# Patient Record
Sex: MALE | Race: WHITE | Employment: OTHER | URBAN - METROPOLITAN AREA
[De-identification: names, ages, dates, MRNs, and addresses within clinical notes are randomized per-mention and may not be internally consistent; named-entity substitution may affect disease eponyms.]

---

## 2017-03-06 ENCOUNTER — TELEPHONE (OUTPATIENT)
Dept: FAMILY MEDICINE | Facility: CLINIC | Age: 82
End: 2017-03-06

## 2017-03-06 NOTE — TELEPHONE ENCOUNTER
Reason for call: Jose Medina, investigator with [facility], requesting cognitive assessment for pt.  Neighbor of pt called [facility] with concerns about pt.  He is a former pt of  ([facility]),  Neighbors have been helping pt this past year with groceries, looking in on pt.  They disconnected battery in car 6/2016 so pt could not drive.  Pt has not taken meds in over a year, incontinent and home is filthy.  Weight loss is significant, only eating once daily.  Jose requesting call back after pt is seen by doctor..    Phone Number Jose: [phone]  Best Time: Anytime  Can we leave a detailed message on this number? Yes

## 2017-03-16 ENCOUNTER — HOSPITAL ENCOUNTER (OUTPATIENT)
Facility: CLINIC | Age: 82
Setting detail: OBSERVATION
Discharge: NURSING, REHAB OR LONG-TERM CARE | End: 2017-03-21
Attending: NURSE PRACTITIONER | Admitting: HOSPITALIST
Payer: MEDICARE

## 2017-03-16 DIAGNOSIS — G30.1 LATE ONSET ALZHEIMER'S DISEASE WITHOUT BEHAVIORAL DISTURBANCE (H): ICD-10-CM

## 2017-03-16 DIAGNOSIS — Z74.09 IMPAIRED MOBILITY: ICD-10-CM

## 2017-03-16 DIAGNOSIS — F02.80 LATE ONSET ALZHEIMER'S DISEASE WITHOUT BEHAVIORAL DISTURBANCE (H): ICD-10-CM

## 2017-03-16 DIAGNOSIS — E53.8 VITAMIN B12 DEFICIENCY (NON ANEMIC): Primary | ICD-10-CM

## 2017-03-16 DIAGNOSIS — R62.7 FAILURE TO THRIVE IN ADULT: ICD-10-CM

## 2017-03-16 DIAGNOSIS — R41.3 MEMORY DIFFICULTIES: ICD-10-CM

## 2017-03-16 PROBLEM — Z60.9 SOCIAL PROBLEM: Status: ACTIVE | Noted: 2017-03-16

## 2017-03-16 LAB
ALBUMIN SERPL-MCNC: 3.4 G/DL (ref 3.4–5)
ALBUMIN UR-MCNC: NEGATIVE MG/DL
ALP SERPL-CCNC: 110 U/L (ref 40–150)
ALT SERPL W P-5'-P-CCNC: 11 U/L (ref 0–70)
ANION GAP SERPL CALCULATED.3IONS-SCNC: 9 MMOL/L (ref 3–14)
APPEARANCE UR: CLEAR
AST SERPL W P-5'-P-CCNC: 15 U/L (ref 0–45)
BILIRUB SERPL-MCNC: 1.2 MG/DL (ref 0.2–1.3)
BILIRUB UR QL STRIP: NEGATIVE
BUN SERPL-MCNC: 16 MG/DL (ref 7–30)
CALCIUM SERPL-MCNC: 8.7 MG/DL (ref 8.5–10.1)
CHLORIDE SERPL-SCNC: 108 MMOL/L (ref 94–109)
CO2 SERPL-SCNC: 23 MMOL/L (ref 20–32)
COLOR UR AUTO: NORMAL
CREAT SERPL-MCNC: 1.01 MG/DL (ref 0.66–1.25)
ERYTHROCYTE [DISTWIDTH] IN BLOOD BY AUTOMATED COUNT: 13.3 % (ref 10–15)
GFR SERPL CREATININE-BSD FRML MDRD: 70 ML/MIN/1.7M2
GLUCOSE SERPL-MCNC: 112 MG/DL (ref 70–99)
GLUCOSE UR STRIP-MCNC: NEGATIVE MG/DL
HCT VFR BLD AUTO: 39.8 % (ref 40–53)
HGB BLD-MCNC: 13.3 G/DL (ref 13.3–17.7)
HGB UR QL STRIP: NEGATIVE
KETONES UR STRIP-MCNC: NEGATIVE MG/DL
LEUKOCYTE ESTERASE UR QL STRIP: NEGATIVE
MCH RBC QN AUTO: 30 PG (ref 26.5–33)
MCHC RBC AUTO-ENTMCNC: 33.4 G/DL (ref 31.5–36.5)
MCV RBC AUTO: 90 FL (ref 78–100)
NITRATE UR QL: NEGATIVE
PH UR STRIP: 6.5 PH (ref 5–7)
PLATELET # BLD AUTO: 126 10E9/L (ref 150–450)
POTASSIUM SERPL-SCNC: 3.8 MMOL/L (ref 3.4–5.3)
PROT SERPL-MCNC: 7.2 G/DL (ref 6.8–8.8)
RBC # BLD AUTO: 4.44 10E12/L (ref 4.4–5.9)
SODIUM SERPL-SCNC: 140 MMOL/L (ref 133–144)
SP GR UR STRIP: 1 (ref 1–1.03)
URN SPEC COLLECT METH UR: NORMAL
UROBILINOGEN UR STRIP-MCNC: NORMAL MG/DL (ref 0–2)
WBC # BLD AUTO: 3.2 10E9/L (ref 4–11)

## 2017-03-16 PROCEDURE — G0378 HOSPITAL OBSERVATION PER HR: HCPCS

## 2017-03-16 PROCEDURE — 40000916 ZZH STATISTIC SITTER, NIGHT HOURS

## 2017-03-16 PROCEDURE — 85027 COMPLETE CBC AUTOMATED: CPT | Performed by: NURSE PRACTITIONER

## 2017-03-16 PROCEDURE — 81003 URINALYSIS AUTO W/O SCOPE: CPT | Performed by: NURSE PRACTITIONER

## 2017-03-16 PROCEDURE — 80053 COMPREHEN METABOLIC PANEL: CPT | Performed by: NURSE PRACTITIONER

## 2017-03-16 PROCEDURE — 99220 ZZC INITIAL OBSERVATION CARE,LEVL III: CPT | Performed by: PHYSICIAN ASSISTANT

## 2017-03-16 PROCEDURE — 25000132 ZZH RX MED GY IP 250 OP 250 PS 637: Mod: GY | Performed by: PHYSICIAN ASSISTANT

## 2017-03-16 PROCEDURE — 99285 EMERGENCY DEPT VISIT HI MDM: CPT | Mod: 25

## 2017-03-16 PROCEDURE — A9270 NON-COVERED ITEM OR SERVICE: HCPCS | Mod: GY | Performed by: PHYSICIAN ASSISTANT

## 2017-03-16 RX ORDER — ACETAMINOPHEN 325 MG/1
650 TABLET ORAL EVERY 4 HOURS PRN
Status: DISCONTINUED | OUTPATIENT
Start: 2017-03-16 | End: 2017-03-21 | Stop reason: HOSPADM

## 2017-03-16 RX ORDER — AMOXICILLIN 250 MG
1-2 CAPSULE ORAL 2 TIMES DAILY PRN
Status: DISCONTINUED | OUTPATIENT
Start: 2017-03-16 | End: 2017-03-21 | Stop reason: HOSPADM

## 2017-03-16 RX ORDER — ONDANSETRON 2 MG/ML
4 INJECTION INTRAMUSCULAR; INTRAVENOUS EVERY 6 HOURS PRN
Status: DISCONTINUED | OUTPATIENT
Start: 2017-03-16 | End: 2017-03-21 | Stop reason: HOSPADM

## 2017-03-16 RX ORDER — ACETAMINOPHEN 650 MG/1
650 SUPPOSITORY RECTAL EVERY 4 HOURS PRN
Status: DISCONTINUED | OUTPATIENT
Start: 2017-03-16 | End: 2017-03-21 | Stop reason: HOSPADM

## 2017-03-16 RX ORDER — ONDANSETRON 4 MG/1
4 TABLET, ORALLY DISINTEGRATING ORAL EVERY 6 HOURS PRN
Status: DISCONTINUED | OUTPATIENT
Start: 2017-03-16 | End: 2017-03-21 | Stop reason: HOSPADM

## 2017-03-16 RX ORDER — OLANZAPINE 5 MG/1
5 TABLET, ORALLY DISINTEGRATING ORAL
Status: DISCONTINUED | OUTPATIENT
Start: 2017-03-16 | End: 2017-03-21 | Stop reason: HOSPADM

## 2017-03-16 RX ORDER — NALOXONE HYDROCHLORIDE 0.4 MG/ML
.1-.4 INJECTION, SOLUTION INTRAMUSCULAR; INTRAVENOUS; SUBCUTANEOUS
Status: DISCONTINUED | OUTPATIENT
Start: 2017-03-16 | End: 2017-03-21 | Stop reason: HOSPADM

## 2017-03-16 RX ORDER — HYDRALAZINE HYDROCHLORIDE 20 MG/ML
10 INJECTION INTRAMUSCULAR; INTRAVENOUS EVERY 4 HOURS PRN
Status: DISCONTINUED | OUTPATIENT
Start: 2017-03-16 | End: 2017-03-21 | Stop reason: HOSPADM

## 2017-03-16 RX ADMIN — OLANZAPINE 5 MG: 5 TABLET, ORALLY DISINTEGRATING ORAL at 22:42

## 2017-03-16 ASSESSMENT — ENCOUNTER SYMPTOMS
CONFUSION: 1
FEVER: 0
APPETITE CHANGE: 1
ABDOMINAL PAIN: 0
COUGH: 0

## 2017-03-16 NOTE — ED NOTES
Son Anish called and said he was told to call because his father was brought in.  Explained to son  called for welfare check because pt missed appointment with  yesterday.  Pt will be evaluated and would update son when completed Son's number placed in chart.  Son said he has some dementia

## 2017-03-16 NOTE — IP AVS SNAPSHOT
` `     [place] [address] MEDICAL SPECIALTY UNIT: [phone]                 INTERAGENCY TRANSFER FORM - NOTES (H&P, Discharge Summary, Consults, Procedures, Therapies)   3/16/2017                    Hospital Admission Date: 3/16/2017  EMA VILA   : 1928  Sex: Male        Patient PCP Information     Provider PCP Type    Johnny Tafoya MD, MD General         History & Physicals      H&P signed by Jesus Thurman PA-C at 3/17/2017  7:53 AM      Author:  Jesus Thurman PA-C Service:  Hospitalist Author Type:  Physician Assistant - C    Filed:  3/17/2017  7:53 AM Date of Service:  3/16/2017  4:35 PM Note Created:  3/16/2017  5:37 PM    Status:  Cosign Needed :  Jesus Thurman PA-C (Physician Assistant - [facility])    Cosign Required:  Yes             PRIMARY CARE PHYSICIAN:  Johnny Tafoya MD      CHIEF COMPLAINT:  Welfare check.      HISTORY OF PRESENT ILLNESS:  Ema Vila is an 89-year-old male with past medical history of hip osteoarthritis, severe aortic stenosis, iron deficiency anemia and peripheral neuropathy who presented to the Emergency Department via EMS for evaluation of vulnerable adult.  Patient reportedly did not show to a previously scheduled PCP appointment to establish care with a new primary care provider as the current one is out of practice.  Adult Protection of [facility] was called regarding patient due to a neighbor's becoming concerned and requesting a cognitive assessment.  Apparently over the past year, patient's neighbors have been assisting patient with groceries and making sure that he is eating and checking in on him.  They note that he has not taken any medications in at least a year and they did disconnect his car battery in 2016 so that patient would not be able to drive.  Additionally, there was some concern about the nature of the patient's house and his ability to care for himself .     Adult Protection was initially contacted on 2017 and subsequently  "yesterday, 03/15/2017, patient was a no-show for his appointment to establish care and therefore clinic did notify the [facility] who sent out an officer to check on patient.  Upon arriving at the home, the [facility]  noted that the home was in disarray, that there was fecal matter stained on a mattress and the patient apparently had been voiding in a bucket next to his chair as it was too difficult for him to ambulate to and from the bathroom.  Therefore, out of concern for patient's safety he was transported to the Emergency Department at [facility] for evaluation.      Upon arrival in the Emergency Department, the patient was seen by Jean Eaton NP, who noted the patient to be slightly disheveled and also noted that patient was not aware of why he was brought in to the Emergency Department and was continuing to demand to return home.  He was evaluated with laboratory studies including a CMP and a CBC which were unremarkable.  UA was also performed and was negative.  The patient overall was afebrile with a normal heart rate in appearance and again persistently requested to be discharged home.  However, out of patient's safety, Hospitalist Service was contacted for observation admission.      The patient was presently evaluated in the Emergency Department.  He currently tells me that he does not understand why he needs to come in the hospital and that he feels fine and that he typically does all of his grocery shopping and that he drives to all of his appointments and that he is extremely independent at baseline.  He denies the difficulty ambulating, stating \"that's why I have a cane.\"  He also does not feel that he is unsafe at home and persistently requests to return home.  He denies any recent fevers or chills, difficulty breathing, chest pain, chest discomfort, shortness of breath, abdominal pain.  He notes that he has been having normal bowel and bladder habits and that he is otherwise " at his regular, normal baseline.      PAST MEDICAL HISTORY:  This is obtained via the charting.   1.  Hip osteoarthritis of the right hip.   2.  Severe aortic stenosis.  The patient was most recently seen by Cardiology in  and at that time recommended valve replacement the next 6 months.  The patient's most recent echocardiogram was performed in  revealing an EF of 75% to 80%, mild left ventricular hypertrophy and severe aortic stenosis with a gradient of 42 mmHg, no evidence of aortic insufficiency at that time.   3.  Iron deficiency anemia.   4.  Weight loss.  The patient was noted to have weight loss in PCP appointments as recently as .  He at that time weighed 189 pounds and he was noted to have recently lost 45 pounds within the past 18 months prior to that.  Today, he now weighs 205 pounds.   5.  Peripheral neuropathy.   6.  Hypertension.   7.  Gout.   8.  Cervical degenerative disk disease.      PAST SURGICAL HISTORY:   1.  Lumbar spine diskectomy.   2.  Left knee arthroscopy.      PRIOR TO ADMISSION MEDICATIONS:  The patient is not taking any currently.      ALLERGIES:  Penicillin causing a rash.      FAMILY HISTORY:  Father passed secondary to prostate cancer at the age of 82 and mother  in her 90s due to natural causes.      SOCIAL HISTORY:  The patient presently is living in a single family home by himself as noted above.  He has neighbors who have been assisting with his cares, however, have been increasingly worried about his ability to care for himself.  He does not smoke tobacco on a regular basis, does not consume alcohol on a regular basis.      REVIEW OF SYSTEMS:  A 10-point review of systems was performed and is otherwise negative.  Please refer to the HPI.      PHYSICAL EXAMINATION:   VITAL SIGNS:  Temperature 97.7, heart rate of 80, respiratory rate 18, blood pressure 164/91.  SpO2 99% on room air.   GENERAL:  Well-developed, well-nourished male who appears comfortable.   HEENT:   Head is normocephalic.  Pupils are equal, round, reactive bilaterally.  EOMs are intact bilaterally.  Nose, mouth are patent.  Mucous membranes are moist.   LUNGS:  Clear to auscultation bilaterally without wheezes or crackles.  There is no increased work of breathing.   CARDIOVASCULAR:  Regular rate and rhythm, normal S1, S2.  There is a large systolic ejection murmur.  Radial and pedal pulses are 2+ bilaterally.   ABDOMEN:  Soft, nontender, nondistended, no guarding or rigidity.  Positive bowel sounds in all 4 quadrants.  The patient is spontaneously moving bilateral upper and lower extremities. SKIN:  Warm and dry.  There is no rash.  There is no pedal edema.      LABORATORY IMAGING:  As reviewed in Epic and as noted in HPI.      ASSESSMENT AND PLAN:  Bhaskar Hope is an 89-year-old male with past medical history of hip osteoarthritis, severe aortic stenosis and iron deficiency anemia who presented to the Emergency Department following recheck performed by a [facility]  and finding the house in disarray and concern over patient's home safety and inability to care for himself at home and he will therefore be admitted to the observation unit for evaluation and possible placement.   1.  Inability to care for self.  The patient, as noted above, was found to have a house in disarray with minimal oral intake and has unknowingly been reliant on his neighbors for food and care over the past year.  He tells me that he is continuing to drive and that he goes to all of his appointments and does all of his grocery shopping; however, the reality is that his neighbors have disconnected his car battery in 06/2016 and he has not seen a primary care provider in at least 1 year and does not keep up on any of his grocery shopping.  It appears that he has limited mobility secondary to his right hip osteoarthritis and there is some concern about his inability to ambulate and care for herself at home.  The patient has never  been formally diagnosed with memory problems or memory loss; however, it is quite apparent that he does not appear to have a great deal short-term memory at this time.  On review of chart, there is a note from 07/2015 from the last one last times that Dr. Johnny Tafoya saw patient in clinic and at that time he did present with his son who noticed that patient has been unable to handle his living arrangements at home and that he sits in a chair all day with his front and back door open so people can enter.  He also at that time was having difficulty shopping and preparing meals due to severe right hip osteoarthritis.  Additionally at that time, the patient appeared to have a similar presentation to today where he was confabulating and stating that he commonly goes to the grocery store and buys groceries, walks around the block several times a day and that he continues to drive, which again is unfortunately not the case.  It is also noted in the PCP appointment note that home health aide was ordered for patient, but patient refused this and that the son had been looking into assisted living at that time in 2015.  Due to patient's ongoing memory issues and persistent desire to return home with the belief that he is managing extremely well, we will ask Psychiatry to evaluate for competency and additionally will ask Physical Therapy to evaluate for patient's ability to ambulate and manage at home safely.  Social work will be consulted for discharge planning, as patient will likely require placement.  2.  Severe aortic stenosis.  As noted above, patient has not been compliant with followup as an outpatient and as a result has not been taking any medication for this.  He appears presently to be decompensated.  He has no lower extremity edema.  He denies any dizziness or lightheadedness, albeit he has also not been ambulating very much.  We will continue to monitor him while under observation,  particularly when up and  ambulating and will provide referral to Cardiology at discharge if indicated.   3.  History of iron deficiency anemia.  The patient's hemoglobin today is presently within normal limits at 13.3.  Continued monitoring as an outpatient.   4.  Elevated blood pressure.  The patient reportedly with a remote history of hypertension; however, per primary care note from , this had resolved at that time. Patient's blood pressure today is elevated at 164/91 and may be a stress response.  We will continue to monitor.  The patient will have p.r.n. hydralazine available for systolic blood pressure greater than 180.  Otherwise, will defer to outpatient setting pending evaluation as above.   5.  Deep venous thrombosis prophylaxis:  Ambulation as I anticipate short stay.      The patient was staffed with Dr. Jonathan Coats who independently interviewed and evaluated patient and is in agreement with the above-mentioned plan.         JONATHAN COATS MD       As dictated by JESUS RANDLE PA-C            D: 2017 16:35   T: 2017 17:37   MT: TYLER      Name:     EMA VILA   MRN:      [id number]        Account:      [id number]   :      1928           Admitted:     [id number]      Document: [id number]       cc: Johnny Tafoya MD[AF1.1]        Revision History        User Key Date/Time User Provider Type Action    > AF1.1 3/17/2017  7:53 AM Jesus Randle PA-C Physician Assistant - [facility] Sign     [N/A] 3/16/2017  5:37 PM Jesus Randle PA-C Physician Assistant - [facility] Edit                  Discharge Summaries     No notes of this type exist for this encounter.         Consult Notes      Consults signed by Frederic Kamara MD at 3/19/2017 12:34 AM      Author:  Frederic Kamara MD Service:  Psychiatry Author Type:  Physician    Filed:  3/19/2017 12:34 AM Date of Service:  3/17/2017  8:51 PM Note Created:  3/18/2017  2:59 AM    Status:  Signed :  Frederic Kamara MD (Physician)         PSYCHIATRIC  "CONSULTATION      DATE OF CONSULTATION:  03/17/2017      REQUESTING PHYSICIAN:  Jesus Thurman PA-C      REASON FOR CONSULTATION:  Competency.      IDENTIFICATION:  Mr. Bhaskar Hope is an 89-year-old  male, possibly , who lives in [place].  No prior psychiatric exams.  No prior chemical dependency treatment, admitted after a vulnerable adult evaluation by EMS.      HISTORY OF PRESENT ILLNESS:  Mr. Hope states he feels fine.  He denies feeling depressed or down at all.  Does not have any psychiatric symptoms, denies obsessions, compulsions or psychosis.  No panic disorder.  He thinks his memory is fine.  Apparently his neighbor noticed, had been assisting patient with groceries and making sure that he was eating and checking on him, then he noted that he had not taken his medications in at least a year.  \"I disconnected his car battery 06/2016 so he wouldn't drive anymore.\"  Adult Protection was called, they came after a primary care visit was missed.   noticed his house was in disarray, there was fecal matter stained on the mattress.  Patient apparently had been voiding in a bucket next to his chair; too difficult for him to ambulate to and from the bathroom.  Patient was disheveled, no signs of infection.      PAST PSYCHIATRIC HISTORY:  None.      PAST MEDICAL HISTORY:  He has a history of osteoarthritis, severe aortic stenosis, elevated blood pressure.  Also, significant weight loss 45 pounds.        ALLERGIES:  In old history, it lists that he has a penicillin rash.        MEDICAL REVIEW OF SYSTEMS:  A 10-point review of systems was performed and otherwise negative except for the history of present illness by Jonathan Coats MD and dictated by Jesus Thurman PA-C on 03/16/2017, reviewed by Dr. Kamara on 03/17/2017.  No changes.      PHYSICAL EXAMINATION:   VITAL SIGNS:  Temperature 97.7, heart rate 80, respiration 18, blood pressure 169/91, oxygen saturation 99%.      SOCIAL " "HISTORY:  The patient states that he was born and raised in [place].  Said he was an only child.  Grew up with both parents.  Graduated from [employer].  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  He states that he is still  and lives with his wife.  At this point, patient does not have a wife in the house but he could not remember that.      MENTAL STATUS EXAMINATION:  Appearance:  Patient was sitting up in bed, dressed in hospital attire.  Appeared his stated age.  His attitude was cooperative.  He was oriented to person but not place or time.  His eye contact was fair.  His speech was regular rate and rhythm, normal volume and tone.  Language normal.  Psychomotor behavior normal.  Mood was euthymic.  Affect was congruent.  Thought process goal oriented and intact.  Thought content negative for suicidal ideation, negative for plan or intent, negative for obsessions, compulsions, or psychosis.  No looseness of associations.  Fund of knowledge impaired.  Insight impaired.  Judgment impaired.  Attention span and concentration poor.  Recent and remote memory poor.  Gait not tested.  A Folstein Mini Mental Status was performed by Dr. Kamara, scored 11/30, which is significant impairment.  When asked about current events, he did not know who the president was.  He thought it was maybe that \"black man\" was Heri Proctor, did not know that Jeremías Wright was currently president.      ASSESSMENT:  Mr. Hope is an 89-year-old either  or   male who is currently living alone in the community and he has significant memory impairment, is clearly in need of placement.  Welfare check came to look in on him and he had feces on his bed.  He was trying to pee in a bucket because he could not walk around.  He had not taken his medications in " over a year.  Neighbors were trying to take care of him by undoing his car battery.  He has very poor insight and wants to just go home and thinks he is just fine, cannot see why he should not be able to go there.  At this point, no phone numbers are listed for his children.  He does have an adult son apparently in [place], last name Herminia, pretty hard to find that name without a phone number, also has a son in [place].  His neighbor number is listed who had been consulted by the , went to see him for Adult Protection.  The patient is clearly impaired and needs to be in placement.  Guardianship will likely need to be appointed on an emergency basis.      DIAGNOSIS:   Axis I:  Dementia, cognitive impairment without psychosis.      PLAN:   1.  Provide safe space stabilization.   2.  Get an emergency guardianship.   3.  The patient needs to be referred to nursing home placement.         FREDERIC KAMARA MD             D: 2017 20:51   T: 2017 21:51   MT: TD      Name:     EMA VILA   MRN:      [id number]        Account:       [id number]   :      1928           Consult Date:  2017      Document: [id number]       cc: JESUS RANDLE PA-C   [TW1.1]   Revision History        User Key Date/Time User Provider Type Action    > TW1.1 3/19/2017 12:34 AM Frederic Kamara MD Physician Sign            Consults by Frederic Kamara MD at 3/17/2017  8:26 PM     Author:  Frederic Kamara MD Service:  Psychiatry Author Type:  Physician    Filed:  3/17/2017  8:36 PM Date of Service:  3/17/2017  8:26 PM Note Created:  3/17/2017  8:26 PM    Status:  Signed :  Frederic Kamara MD (Physician)     Consult Orders:    1. Psychiatry IP Consult: competency; Consultant may enter orders: Yes; Patient to be seen: Routine; Call back #: [phone] [[phone]] ordered by Jesus Randle PA-C at 17 1503                Initial Psychiatric Consult: Time Spent: 55  "Minutes  Frederic Kamara MD.[TW1.1]      Revision History        User Key Date/Time User Provider Type Action    > TW1.1 3/17/2017  8:36 PM Frederic Kamara MD Physician Sign                     Progress Notes - Physician (Notes from 03/18/17 through 03/21/17)      Progress Notes by Darcy Chen LICSW at 3/21/2017  4:10 PM     Author:  Darcy Chen LICSW Service:  (none) Author Type:      Filed:  3/21/2017  4:19 PM Date of Service:  3/21/2017  4:10 PM Note Created:  3/21/2017  4:10 PM    Status:  Signed :  Darcy Chen LICSW ()         SW:  OT SLUM score confirms significant short term memory deficit with recommendation for TCU or 24 hour supervision.  Patient's neighbor Mario was able to locate patient's check book at home and bring it over to the hospital.  Writer met with patient and Mario and Mario explained to patient that he thought it would be a good idea if patient went to [facility].    Writer reviewed again the plan of going to [facility] for ongoing therapy assessment and recommendation for long term care.  Reviewed the deposit needed of $3,000 and patient accepted the arrangement.  Patient did say \"I guess I don't have any choice\".  Writer had earlier reviewed with patient that if OT concurred with earlier testing that the Dr would not discharge him home and if patient did not voluntarily agree with the recommendation, the MD would possible seek legal involvement.    Writer contacted son Anish and explained above, including the cost for care and that patient will be signing the check.  Son asked if writer would talk with patient about POA for HC and Finances so guardianship would not need to be pursued.  In speaking with patient he believes Sincere Painting has POA of Finances and in relation to HC he would want to appoint \"the same person\".  Updated son and he will follow up with Mr Painting.    Son also given Ladonna Townsend name and number for " resource.[KH1.1]       Revision History        User Key Date/Time User Provider Type Action    > KH1.1 3/21/2017  4:19 PM Darcy Chen LICSW  Sign            Progress Notes by Darcy Chen LICSW at 3/21/2017 12:44 PM     Author:  Darcy Chen LICSW Service:  (none) Author Type:      Filed:  3/21/2017  1:03 PM Date of Service:  3/21/2017 12:44 PM Note Created:  3/21/2017 12:44 PM    Status:  Signed :  Darcy Chen LICSW ()         SW:  D:  Met with patient today and also spoke with two of patient's friends. Doug Griffin [phone], [phone].  He reports patient has poor short term memory.  He states patient will tell me he still drives but he knows he has not driven for over a year. Mario also reports patient hasn't driven for over a year.  Mario also reports that a friend Pedro Painting buys patient's groceries and is .  Writer tried calling Mr Painting, [phone], [phone] but his work voice mail indicates he is out of town till March 27.      Patient is very pleasant and strong in his conviction that he can live independently.    He reports he eats three meals a day and maintains his weight.  He insists that he still drives to [facility] to purchase his groceries. Writer explained his friends report he hasn't driven for over a year.  Explained the adult protection worker reported feces in his house.  Patient declines this and over and over tells writer how he is such a good  and offers writer to come to his house to see.    Writer recommended he transfer to [facility] for further assessment to determine if professionals feel he can return home and if not suggested he would be encouraged to move into [facility] or move to an assisted living.  Explained the cost for the TCU will be approximately $300.00 a day and he will be required to make a down payment of $3,000.  [facility] as his father lived there and patients home is only a few blocks away.   Patient continues to believe he can return home and doesn't see the need for admission to a transitional care center.  Patient appears to be confabulating about his driving.  Writer did tell patient that MD will not d/c him home and that if he doesn't voluntarily agree to the plan, the doctor may ask the courts to decide if he can go home.  Writer also explained he had been given an exam by the psychiatrist indicating he cannot return home.  Again patient doesn't remember this or agree with the findings.  Given this, writer has confired with care coordinator and we are asking OT to complete SlUMS testwith patient today.  If they concur with Dr Kamara's assessment then writer feels patient will cooperate.  If he doesn't cooperate then we will likely need to consider petition for commitment.  OT will see patient around 1400[KH1.1]         Revision History        User Key Date/Time User Provider Type Action    > KH1.1 3/21/2017  1:03 PM [facility]  [facility]            Progress Notes by Raquel Long MD at 3/20/2017  4:49 PM     Author:  Raquel Long MD Service:  Hospitalist Author Type:  Physician    Filed:  3/20/2017  4:58 PM Date of Service:  3/20/2017  4:49 PM Note Created:  3/20/2017  4:49 PM    Status:  Signed :  Raquel Long MD (Physician)         [facility]    Hospitalist Progress Note    Date of Service (when I saw the patient): 03/20/2017    Assessment & Plan   Bhaskar Hope is an 89-year-old male with past medical history of hip osteoarthritis, severe aortic stenosis and iron deficiency anemia who presented to the Emergency Department following recheck performed by a [facility]  and finding the house in disarray and concern over patient's home safety and inability to care for himself at home and he is admitted under observation for evaluation and  possible placement.      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 He was found to have a house in disarray with minimal oral intake and has unknowingly been reliant on his neighbors for food and care over the past 1-2 years. He reports that he is continuing to drive and that he goes to all of his appointments and does all of his grocery shopping; however, the reality is that his neighbors have disconnected his car battery in 06/2016 and he has not seen a primary care provider in at least 1 year and does not keep up on any of his grocery shopping. It appears that he has limited mobility secondary to his right hip osteoarthritis and there is some concern about his inability to ambulate and care for herself at home.   - No formal diagnosis of memory problems or cognitive impairment, but during interview he displays problem with short memory   - Due to patient's ongoing memory issues and persistent desire to return home with the belief that he is managing extremely well, Psychiatry was asked to evaluate for competency, and agree exam is consistent with dementia   - PT recommending home with 24 hour supervision or TCU.   - SW also following for safe discharge planning, he will definitely need placement or 24 hrs supervision  - Treat B12 deficiency (see below)  - TSH 5.91, likely normal for age, check T4. F/u in 3 months.   - CT scan of the head as outpatient. No focal deficits.     B12 Deficiency   - Start 1000 mcg IM daily for 1 week, then weekly for 4 weeks, then re-assess level and consider change to PO.   - Normal hgb @ admission (13.3>12.1) and normal MCV, mildly low WBC 3.2>4.5 and Plts 133.   - Folate normal.      Severe aortic stenosis.   He denies any dizziness or lightheadedness, albeit he has also not been ambulating very much.  - will monitor at this time     History of iron deficiency anemia.   hgb 13.3 at admission, nl MCV. >      Hx of HTN: has  not been on meds for this since 2015.   - would not be aggressive in lowering BP in light of his severe AS  - BP controlled.      DVT Prophylaxis: ambulatio     Disposition: pending safe placement plan.     Raquel Long  [phone] (p)  Text Page (7AM - 6PM)     Interval History   Patient continues to adamantly deny that he has any problem caring for himself. Hopeful discharge to [facility] tomorrow. Coordinating with son.     -Data reviewed today: I reviewed all new labs and imaging results over the last 24 hours. I personally reviewed no images or EKG's today.    Physical Exam   Temp: 97.3  F (36.3  C) Temp src: Oral BP: 123/76 Pulse: 78   Resp: 16 SpO2: 98 % O2 Device: None (Room air)    Vitals:    03/16/17 1201 03/16/17 1900   Weight: 93 kg (205 lb) 83.5 kg (184 lb)     Vital Signs with Ranges  Temp:  [97.3  F (36.3  C)-98.6  F (37  C)] 97.3  F (36.3  C)  Pulse:  [71-80] 78  Resp:  [16-18] 16  BP: (103-123)/(68-76) 123/76  SpO2:  [97 %-98 %] 98 %  I/O last 3 completed shifts:  In: 600 [P.O.:600]  Out: 400 [Urine:400]    Constitutional:  NAD, Sitting up in bed. Very interactive and talkative.   Neuropsyche: alert, oriented to being in hospital, but not date or recent event, very short term memory.   Resp breathing comfortably, no edema.       Medications   All medications reviewed on 3/18   No scheduled medications currently.           Data     Recent Labs  Lab 03/20/17  0840 03/16/17  1240   WBC 4.5 3.2*   HGB 12.1* 13.3   MCV 91 90   * 126*   NA  --  140   POTASSIUM  --  3.8   CHLORIDE  --  108   CO2  --  23   BUN  --  16   CR  --  1.01   ANIONGAP  --  9   VINH  --  8.7   GLC  --  112*   ALBUMIN  --  3.4   PROTTOTAL  --  7.2   BILITOTAL  --  1.2   ALKPHOS  --  110   ALT  --  11   AST  --  15       No results found for this or any previous visit (from the past 24 hour(s)).[MM1.1]         Revision History        User Key Date/Time User Provider Type Action    > MM1.1 3/20/2017  4:58 PM Raquel Long  MD ABIODUN Physician Sign            Progress Notes by Darcy Chen LICSW at 3/20/2017  4:47 PM     Author:  Darcy Chen LICSW Service:  (none) Author Type:      Filed:  3/20/2017  4:50 PM Date of Service:  3/20/2017  4:47 PM Note Created:  3/20/2017  4:47 PM    Status:  Signed :  Darcy Chen LICSW ()           Working with son and [facility] regarding placment.[KH1.1]     Revision History        User Key Date/Time User Provider Type Action    > KH1.1 3/20/2017  4:50 PM Darcy Chen LICSW  Sign            Progress Notes by Raquel Long MD at 3/19/2017  3:42 PM     Author:  Raquel Long MD Service:  Hospitalist Author Type:  Physician    Filed:  3/19/2017  3:45 PM Date of Service:  3/19/2017  3:42 PM Note Created:  3/19/2017  3:42 PM    Status:  Signed :  Raquel Long MD (Physician)         [facility]    Hospitalist Progress Note    Date of Service (when I saw the patient): 03/19/2017    Assessment & Plan   Bhaskar Hope is an 89-year-old male with past medical history of hip osteoarthritis, severe aortic stenosis and iron deficiency anemia who presented to the Emergency Department following recheck performed by a [facility]  and finding the house in disarray and concern over patient's home safety and inability to care for himself at home and he is admitted under observation for evaluation and possible placement.      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 He was found to have a house in disarray with minimal oral intake and has unknowingly been reliant on his neighbors for food and care over the past 1-2 years. He reports that he is continuing to drive and that he goes to all of his appointments and does all of his grocery shopping; however, the reality is that his neighbors have disconnected his car battery in 06/2016 and he has not seen a  primary care provider in at least 1 year and does not keep up on any of his grocery shopping. It appears that he has limited mobility secondary to his right hip osteoarthritis and there is some concern about his inability to ambulate and care for herself at home.   - No formal diagnosis of memory problems or cognitive impairment, but during interview he displays problem with short memory   - Due to patient's ongoing memory issues and persistent desire to return home with the belief that he is managing extremely well, Psychiatry was asked to evaluate for competency, and agree exam is consistent with dementia   - PT recommending home with 24 hour supervision or TCU.   - SW also following for safe discharge planning, he will definitely need placement or 24 hrs supervision  - Check TSH, B12 in AM  - CT scan of the head as outpatient. No focal deficits.     WBC mildly low at 3.2 and platlets 126 @ admission)  - No sign of infection.   - recheck CBC in AM     Severe aortic stenosis.   He denies any dizziness or lightheadedness, albeit he has also not been ambulating very much.  - will monitor at this time     History of iron deficiency anemia.   hgb 13.3 during admission.      Hx of HTN: has not been on meds for this since 2015.   - would not be aggressive in lowering BP in light of his severe AS  - BP controlled.      DVT Prophylaxis: ambulatio     Disposition: pending safe placement plan.     Raquel Long  [phone] (p)  Text Page (7AM - 6PM)     Interval History   Patient continues to adamantly deny that he has any problem caring for himself. He is upset by continued hospitalization. No CP, SOB, light-headedness.     -Data reviewed today: I reviewed all new labs and imaging results over the last 24 hours. I personally reviewed no images or EKG's today.    Physical Exam   Temp: 97.8  F (36.6  C) Temp src: Oral BP: 127/80 Pulse: 71   Resp: 17 SpO2: 98 % O2 Device: None (Room air)    Vitals:    03/16/17 1201  03/16/17 1900   Weight: 93 kg (205 lb) 83.5 kg (184 lb)     Vital Signs with Ranges  Temp:  [97.8  F (36.6  C)-97.9  F (36.6  C)] 97.8  F (36.6  C)  Pulse:  [71-75] 71  Resp:  [17-18] 17  BP: (127-131)/(80-81) 127/80  SpO2:  [97 %-98 %] 98 %  I/O last 3 completed shifts:  In: 400 [P.O.:400]  Out: 775 [Urine:775]    Constitutional:  NAD,   Neuropsyche: alert, oriented to being in hospital, but not which one, doesn't know day of the week or month, answers questions appropriately, but answers do not appear to reflect reality from other reports. Face symmetric, CN intact, Speech  Normal. Good strength in upper and lower extremities, but reduced range of motion of right hip flexor due to pain in right hip.. FNF intact.   Respiratory: Breathing comfortably, good air exchange, no wheezes, no crackles.   Cardiovascular:  Regular rate and rhythm with systolic murmur. no edema.  GI:  soft, NT/ND, BS normal  Skin/Integumen:  No acute rash or sign of bleeding.     Medications   All medications reviewed on 3/18   No scheduled medications currently.           Data     Recent Labs  Lab 03/16/17  1240   WBC 3.2*   HGB 13.3   MCV 90   *      POTASSIUM 3.8   CHLORIDE 108   CO2 23   BUN 16   CR 1.01   ANIONGAP 9   VINH 8.7   *   ALBUMIN 3.4   PROTTOTAL 7.2   BILITOTAL 1.2   ALKPHOS 110   ALT 11   AST 15       No results found for this or any previous visit (from the past 24 hour(s)).[MM1.1]         Revision History        User Key Date/Time User Provider Type Action    > MM1.1 3/19/2017  3:45 PM Raquel Long MD Physician Sign            Progress Notes by Shreya Taylor RN at 3/19/2017  7:30 AM     Author:  Shreya Taylor RN Service:  (none) Author Type:  Registered Nurse    Filed:  3/19/2017  3:08 PM Date of Service:  3/19/2017  7:30 AM Note Created:  3/19/2017  2:37 PM    Status:  Signed :  Shreya Taylor, RN (Registered Nurse)         Assumed calls. Resting in bed.[JA1.1]      Revision History         User Key Date/Time User Provider Type Action    > JA1.1 3/19/2017  3:08 PM Shreya Taylor, RN Registered Nurse Sign            Progress Notes by Darcy Chen LICSW at 3/19/2017  9:00 AM     Author:  Darcy Chen LICSW Service:  (none) Author Type:      Filed:  3/19/2017  9:11 AM Date of Service:  3/19/2017  9:00 AM Note Created:  3/19/2017  9:00 AM    Status:  Addendum :  Darcy hCen LICSW ()         SW:  D:  ,Mya Denton, spoke with patient's son Anish Hope[KH1.1] on 3/17. Son lives out of state and said he will return if needed.[KH1.2]    Per Ms Denton's note, there is an open adult protection report and Ms Denton did recommend to son that he explore options for petition of guardianship.  Due to son living out of state, he can work with a local  who can file for guardianship and ask for a local representative to take of guardianship.      A:  Patient admitted as he is a vulnerable adult, psychiatrist supports petition for guardianship to be filed.  Patient admitted under Observation Care to arrange for a same d/c plan.[KH1.1]   PT recommends TCU for further therapy, OT recommends 24/7 supervision or TCU.[KH1.2]   Timeliness of d/c will depend upon patient level of cooperativeness and patient's ability to pay for TCU.   Additional barrier may be the care center not accepting patient without a legal decision maker.    P:  In regards to d/c planning writer will contact son to discuss TCU recommendation for short term safe d/c plan.  TCU will be private pay thus will need to discuss patient's financial resources and if anyone other than patient handles patient's finances.[KH1.1]       Revision History        User Key Date/Time User Provider Type Action    > KH1.2 3/19/2017  9:11 AM Darcy Chen LICSW  Addend     KH1.1 3/19/2017  9:09 AM Darcy Chen LICSW  Sign            Progress Notes by  Raquel Long MD at 3/18/2017  2:52 PM     Author:  Raquel Long MD Service:  Hospitalist Author Type:  Physician    Filed:  3/18/2017  5:52 PM Date of Service:  3/18/2017  2:52 PM Note Created:  3/18/2017  2:52 PM    Status:  Signed :  Raquel Long MD (Physician)         [facility]    Hospitalist Progress Note    Date of Service (when I saw the patient): 03/18/2017    Assessment & Plan   Bhaskar Hope is an 89-year-old male with past medical history of hip osteoarthritis, severe aortic stenosis and iron deficiency anemia who presented to the Emergency Department following recheck performed by a [facility]  and finding the house in disarray and concern over patient's home safety and inability to care for himself at home and he is admitted under observation for evaluation and possible placement.    [MM1.1]  Probable Alzheimer's Dementia -[MM1.2] Inability to care for self/ vulnerable adult[MM1.1]   - See[MM1.2] H & P for detail. He was found to have a house in disarray with minimal oral intake and has unknowingly been reliant on his neighbors for food and care over the past year. He reports that he is continuing to drive and that he goes to all of his appointments and does all of his grocery shopping; however, the reality is that his neighbors have disconnected his car battery in 06/2016 and he has not seen a primary care provider in at least 1 year and does not keep up on any of his grocery shopping. It appears that he has limited mobility secondary to his right hip osteoarthritis and there is some concern about his inability to ambulate and care for herself at home.   -[MM1.1] N[MM1.2]o formal diagnosis of memory problems or cognitive impairment, but during interview he displays problem with short memory   - Due to patient's ongoing memory issues and persistent desire to return home with the belief that he is managing extremely well, Psychiatry[MM1.1] was asked[MM1.2] to  "evaluate for competency,[MM1.1] and agree exam is consistent with dementia[MM1.2]   - PT[MM1.1] recommending home with 24 hour supervision or TCU.[MM1.2]   - SW also following for safe discharge planning, he will definitely need placement or 24 hrs supervision[MM1.1]  - Check TSH, B12 in AM  - CT scan of the head as outpatient. No focal deficits.     WBC mildly low at 3.2 and platlets 126 @ admission)  - No sign of infection.   - recheck CBC in AM[MM1.3]     Severe aortic stenosis.   He denies any dizziness or lightheadedness, albeit he has also not been ambulating very much.  - will monitor at this time     History of iron deficiency anemia.   hgb 13.3 during admission.      Hx of HTN: has not been on meds for this[MM1.1] since 2015.[MM1.2]   - would not be aggressive in lowering BP in light of his severe AS[MM1.1]  - BP controlled.[MM1.2]      DVT Prophylaxis: ambulatio     Disposition: pending[MM1.1] safe placement plan.[MM1.2]     Raquel Long  [phone] (p)  Text Page (7AM - 6PM)     Interval History[MM1.1]   Met with patient, unable to tell me the name of the hospital, but knows he's in the hospital. Does not know the month, day of the week, Governor, president. He persists is saying that he keeps his house \"as neat as a pin.\" Very pleasant.[MM1.3]     -Data reviewed today: I reviewed all new labs and imaging results over the last 24 hours. I personally reviewed[MM1.1] no images or EKG's today[MM1.3].    Physical Exam   Temp: 97.9  F (36.6  C) Temp src: Oral BP: 107/66 Pulse: 76   Resp: 19 SpO2: 97 % O2 Device: None (Room air)    Vitals:    03/16/17 1201 03/16/17 1900   Weight: 93 kg (205 lb) 83.5 kg (184 lb)     Vital Signs with Ranges  Temp:  [97.3  F (36.3  C)-97.9  F (36.6  C)] 97.9  F (36.6  C)  Pulse:  [76-86] 76  Resp:  [18-19] 19  BP: (105-122)/(66-83) 107/66  SpO2:  [96 %-98 %] 97 %  I/O last 3 completed shifts:  In: 960 [P.O.:960]  Out: 825 [Urine:825]    Constitutional:  NAD,   Neuropsyche: " alert[MM1.1], oriented to being in hospital, but which one, doesn't know day of the week or month,[MM1.3] answers questions appropriately.[MM1.1] Face symmetric, CN intact, Speech  Normal. Good strength in upper and lower extremities, but reduced range of motion of right hip flexor due to pain. Patient states he's had a bad right hip. FNF intact.[MM1.3]   Respiratory: Breathing comfortably, good air exchange, no wheezes, no crackles.   Cardiovascular:  Regular rate and rhythm[MM1.1] with systolic murmur.[MM1.3] no edema.  GI:  soft, NT/ND, BS normal  Skin/Integumen:  No acute rash or sign of bleeding.     Medications   All medications reviewed on[MM1.1] 3/18   No scheduled medications currently.[MM1.3]           Data     Recent Labs  Lab 03/16/17  1240   WBC 3.2*   HGB 13.3   MCV 90   *      POTASSIUM 3.8   CHLORIDE 108   CO2 23   BUN 16   CR 1.01   ANIONGAP 9   VINH 8.7   *   ALBUMIN 3.4   PROTTOTAL 7.2   BILITOTAL 1.2   ALKPHOS 110   ALT 11   AST 15       No results found for this or any previous visit (from the past 24 hour(s)).[MM1.1]         Revision History        User Key Date/Time User Provider Type Action    > MM1.3 3/18/2017  5:52 PM Raquel Long MD Physician Sign     MM1.2 3/18/2017  5:18 PM Raquel Long MD Physician      MM1.1 3/18/2017  2:52 PM Raquel Long MD Physician                   Procedure Notes     No notes of this type exist for this encounter.      Progress Notes - Therapies (Notes from 03/18/17 through 03/21/17)     No notes of this type exist for this encounter.

## 2017-03-16 NOTE — IP AVS SNAPSHOT
` `     [place] [address] [facility]: [phone]            Medication Administration Report for Bhaskar Hope as of 03/21/17 1620   Legend:    Given Hold Not Given Due Canceled Entry Other Actions    Time Time (Time) Time  Time-Action       Inactive    Active    Linked        Medications 03/15/17 03/16/17 03/17/17 03/18/17 03/19/17 03/20/17 03/21/17    acetaminophen (TYLENOL) Suppository 650 mg  Dose: 650 mg Freq: EVERY 4 HOURS PRN Route: RE  PRN Reason: mild pain  Start: 03/16/17 1902   Admin Instructions: Alternate ibuprofen (if ordered) with acetaminophen.  Maximum acetaminophen dose from all sources = 75 mg/kg/day not to exceed 4 grams/day.               acetaminophen (TYLENOL) tablet 650 mg  Dose: 650 mg Freq: EVERY 4 HOURS PRN Route: PO  PRN Reason: mild pain  Start: 03/16/17 1902   Admin Instructions: Alternate ibuprofen (if ordered) with acetaminophen.  Maximum acetaminophen dose from all sources = 75 mg/kg/day not to exceed 4 grams/day.               cyanocobalamin (VITAMIN B12) injection 1,000 mcg  Dose: 1,000 mcg Freq: DAILY Route: IM  Start: 03/20/17 1700   End: 03/27/17 0859         1846 (1,000 mcg)-Given        0837 (1,000 mcg)-Given           hydrALAZINE (APRESOLINE) injection 10 mg  Dose: 10 mg Freq: EVERY 4 HOURS PRN Route: IV  PRN Reason: high blood pressure  PRN Comment: give for SBP > 180  Start: 03/16/17 1902              naloxone (NARCAN) injection 0.1-0.4 mg  Dose: 0.1-0.4 mg Freq: EVERY 2 MIN PRN Route: IV  PRN Reason: opioid reversal  Start: 03/16/17 1902   Admin Instructions: For respiratory rate LESS than or EQUAL to 8.  Partial reversal dose:  0.1 mg titrated q 2 minutes for Analgesia Side Effects Monitoring Sedation Level of 3 (frequently drowsy, arousable, drifts to sleep during conversation).Full reversal dose:  0.4 mg bolus for Analgesia Side Effects Monitoring Sedation Level of 4 (somnolent, minimal or no response to stimulation).               OLANZapine zydis  (zyPREXA) ODT tab 5 mg  Dose: 5 mg Freq: AT BEDTIME PRN Route: PO  PRN Reasons: agitation,aggression  Start: 03/16/17 1902   Admin Instructions: Combined IM and PO doses may significantly increase the risk of orthostatic hypotension at 30 mg per day or higher.  With dry hands, peel back foil backing and gently remove tablet; do not push oral disintegrating tablet through foil backing; administer immediately on tongue and oral disintegrating tablet dissolves in seconds; then swallow with saliva; liquid not required.      2242 (5 mg)-Given                ondansetron (ZOFRAN-ODT) ODT tab 4 mg  Dose: 4 mg Freq: EVERY 6 HOURS PRN Route: PO  PRN Reason: nausea  Start: 03/16/17 1902   Admin Instructions: This is Step 1 of nausea and vomiting management.  If nausea not resolved in 15 minutes, go to Step 2 prochlorperazine (COMPAZINE). Do not push through foil backing. Peel back foil and gently remove. Place on tongue immediately. Administration with liquid unnecessary              Or  ondansetron (ZOFRAN) injection 4 mg  Dose: 4 mg Freq: EVERY 6 HOURS PRN Route: IV  PRN Reasons: nausea,vomiting  Start: 03/16/17 1902   Admin Instructions: This is Step 1 of nausea and vomiting management.  If nausea not resolved in 15 minutes, go to Step 2 prochlorperazine (COMPAZINE).  Irritant.               QUEtiapine (SEROquel) half-tab 12.5 mg  Dose: 12.5 mg Freq: 2 TIMES DAILY PRN Route: PO  PRN Comment: agitation  Start: 03/16/17 1902              senna-docusate (SENOKOT-S;PERICOLACE) 8.6-50 MG per tablet 1-2 tablet  Dose: 1-2 tablet Freq: 2 TIMES DAILY PRN Route: PO  PRN Comment: constipation   Start: 03/16/17 1902   Admin Instructions: If no bowel movement in 24 hours, increase to 2 tablets PO BID.  Hold for loose stools.   This is the first step of a three step constipation treatment protocol.            Medications 03/15/17 03/16/17 03/17/17 03/18/17 03/19/17 03/20/17 03/21/17

## 2017-03-16 NOTE — ED NOTES
"[facility]  ED Nurse Handoff Report    ED Chief complaint: welfare check (sociaol worker called ems to check on pt, concerned he is not eating or dinking using a bucket to urinate in by chair.  pt A & O says he is fine in his home has no concerns)      ED Diagnosis:   Final diagnoses:   Failure to thrive in adult   Memory difficulties   Impaired mobility       Code Status: Full Code    Allergies: No Known Allergies    Activity level:  Stand with Assist     Needed?: No    Isolation: No  Infection: Not Applicable    Bariatric?: No      Vital Signs:   Vitals:    03/16/17 1201 03/16/17 1230   BP: (!) 164/91    Pulse: 80    Resp: 18    Temp: 97.7  F (36.5  C)    TempSrc: Oral    SpO2: 97% 99%   Weight: 93 kg (205 lb)    Height: 1.93 m (6' 4\")        Cardiac Rhythm: ,        Pain level: 0-10 Pain Scale: 0    Is this patient confused?: Yes pt lives alone in home oriented self and place but has memory loss, is repeatative and says he is able to care for himself    Patient Report: Initial Complaint:  contacted ems because pt missed appt yesterday and there was concern he was not eating and taking care of herself    Focused Assessment: admit, PT,OT and cognitive eval  Tests Performed: labs,ua  Abnormal Results: see epic  Treatments provided: lunch    Family Comments: 2 sons 1 in [place] who has been contacted and 1 son in [place] he doesn't have much of relationship with    OBS brochure/video discussed/provided to patient: Yes    ED Medications: Medications - No data to display    Drips infusing?:  No      ED NURSE PHONE NUMBER: [phone]         "

## 2017-03-16 NOTE — H&P
PRIMARY CARE PHYSICIAN:  Johnny Tafoya MD      CHIEF COMPLAINT:  Welfare check.      HISTORY OF PRESENT ILLNESS:  Bhaskar Hope is an 89-year-old male with past medical history of hip osteoarthritis, severe aortic stenosis, iron deficiency anemia and peripheral neuropathy who presented to the Emergency Department via EMS for evaluation of vulnerable adult.  Patient reportedly did not show to a previously scheduled PCP appointment to establish care with a new primary care provider as the current one is out of practice.  [facility] was called regarding patient due to a neighbor's becoming concerned and requesting a cognitive assessment.  Apparently over the past year, patient's neighbors have been assisting patient with groceries and making sure that he is eating and checking in on him.  They note that he has not taken any medications in at least a year and they did disconnect his car battery in 06/2016 so that patient would not be able to drive.  Additionally, there was some concern about the nature of the patient's house and his ability to care for himself .     [facility] was initially contacted on 03/06/2017 and subsequently yesterday, 03/15/2017, patient was a no-show for his appointment to establish care and therefore clinic did notify the [facility] who sent out an officer to check on patient.  Upon arriving at the home, the [facility]  noted that the home was in disarray, that there was fecal matter stained on a mattress and the patient apparently had been voiding in a bucket next to his chair as it was too difficult for him to ambulate to and from the bathroom.  Therefore, out of concern for patient's safety he was transported to the Emergency Department at [facility] for evaluation.      Upon arrival in the Emergency Department, the patient was seen by Jean Eaton NP, who noted the patient to be slightly disheveled and also noted that patient was not aware  "of why he was brought in to the Emergency Department and was continuing to demand to return home.  He was evaluated with laboratory studies including a CMP and a CBC which were unremarkable.  UA was also performed and was negative.  The patient overall was afebrile with a normal heart rate in appearance and again persistently requested to be discharged home.  However, out of patient's safety, Hospitalist Service was contacted for observation admission.      The patient was presently evaluated in the Emergency Department.  He currently tells me that he does not understand why he needs to come in the hospital and that he feels fine and that he typically does all of his grocery shopping and that he drives to all of his appointments and that he is extremely independent at baseline.  He denies the difficulty ambulating, stating \"that's why I have a cane.\"  He also does not feel that he is unsafe at home and persistently requests to return home.  He denies any recent fevers or chills, difficulty breathing, chest pain, chest discomfort, shortness of breath, abdominal pain.  He notes that he has been having normal bowel and bladder habits and that he is otherwise at his regular, normal baseline.      PAST MEDICAL HISTORY:  This is obtained via the charting.   1.  Hip osteoarthritis of the right hip.   2.  Severe aortic stenosis.  The patient was most recently seen by Cardiology in 2011 and at that time recommended valve replacement the next 6 months.  The patient's most recent echocardiogram was performed in 2013 revealing an EF of 75% to 80%, mild left ventricular hypertrophy and severe aortic stenosis with a gradient of 42 mmHg, no evidence of aortic insufficiency at that time.   3.  Iron deficiency anemia.   4.  Weight loss.  The patient was noted to have weight loss in PCP appointments as recently as 2015.  He at that time weighed 189 pounds and he was noted to have recently lost 45 pounds within the past 18 months " prior to that.  Today, he now weighs 205 pounds.   5.  Peripheral neuropathy.   6.  Hypertension.   7.  Gout.   8.  Cervical degenerative disk disease.      PAST SURGICAL HISTORY:   1.  Lumbar spine diskectomy.   2.  Left knee arthroscopy.      PRIOR TO ADMISSION MEDICATIONS:  The patient is not taking any currently.      ALLERGIES:  Penicillin causing a rash.      FAMILY HISTORY:  Father passed secondary to prostate cancer at the age of 82 and mother  in her 90s due to natural causes.      SOCIAL HISTORY:  The patient presently is living in a single family home by himself as noted above.  He has neighbors who have been assisting with his cares, however, have been increasingly worried about his ability to care for himself.  He does not smoke tobacco on a regular basis, does not consume alcohol on a regular basis.      REVIEW OF SYSTEMS:  A 10-point review of systems was performed and is otherwise negative.  Please refer to the HPI.      PHYSICAL EXAMINATION:   VITAL SIGNS:  Temperature 97.7, heart rate of 80, respiratory rate 18, blood pressure 164/91.  SpO2 99% on room air.   GENERAL:  Well-developed, well-nourished male who appears comfortable.   HEENT:  Head is normocephalic.  Pupils are equal, round, reactive bilaterally.  EOMs are intact bilaterally.  Nose, mouth are patent.  Mucous membranes are moist.   LUNGS:  Clear to auscultation bilaterally without wheezes or crackles.  There is no increased work of breathing.   CARDIOVASCULAR:  Regular rate and rhythm, normal S1, S2.  There is a large systolic ejection murmur.  Radial and pedal pulses are 2+ bilaterally.   ABDOMEN:  Soft, nontender, nondistended, no guarding or rigidity.  Positive bowel sounds in all 4 quadrants.  The patient is spontaneously moving bilateral upper and lower extremities. SKIN:  Warm and dry.  There is no rash.  There is no pedal edema.      LABORATORY IMAGING:  As reviewed in Epic and as noted in HPI.      ASSESSMENT AND PLAN:   Bhaskra Hope is an 89-year-old male with past medical history of hip osteoarthritis, severe aortic stenosis and iron deficiency anemia who presented to the Emergency Department following recheck performed by a [facility]  and finding the house in disarray and concern over patient's home safety and inability to care for himself at home and he will therefore be admitted to the observation unit for evaluation and possible placement.   1.  Inability to care for self.  The patient, as noted above, was found to have a house in disarray with minimal oral intake and has unknowingly been reliant on his neighbors for food and care over the past year.  He tells me that he is continuing to drive and that he goes to all of his appointments and does all of his grocery shopping; however, the reality is that his neighbors have disconnected his car battery in 06/2016 and he has not seen a primary care provider in at least 1 year and does not keep up on any of his grocery shopping.  It appears that he has limited mobility secondary to his right hip osteoarthritis and there is some concern about his inability to ambulate and care for herself at home.  The patient has never been formally diagnosed with memory problems or memory loss; however, it is quite apparent that he does not appear to have a great deal short-term memory at this time.  On review of chart, there is a note from 07/2015 from the last one last times that Dr. Johnny Tafoya saw patient in clinic and at that time he did present with his son who noticed that patient has been unable to handle his living arrangements at home and that he sits in a chair all day with his front and back door open so people can enter.  He also at that time was having difficulty shopping and preparing meals due to severe right hip osteoarthritis.  Additionally at that time, the patient appeared to have a similar presentation to today where he was confabulating and stating that he commonly  goes to the grocery store and buys groceries, walks around the block several times a day and that he continues to drive, which again is unfortunately not the case.  It is also noted in the PCP appointment note that home health aide was ordered for patient, but patient refused this and that the son had been looking into assisted living at that time in 2015.  Due to patient's ongoing memory issues and persistent desire to return home with the belief that he is managing extremely well, we will ask Psychiatry to evaluate for competency and additionally will ask Physical Therapy to evaluate for patient's ability to ambulate and manage at home safely.  Social work will be consulted for discharge planning, as patient will likely require placement.  2.  Severe aortic stenosis.  As noted above, patient has not been compliant with followup as an outpatient and as a result has not been taking any medication for this.  He appears presently to be decompensated.  He has no lower extremity edema.  He denies any dizziness or lightheadedness, albeit he has also not been ambulating very much.  We will continue to monitor him while under observation,  particularly when up and ambulating and will provide referral to Cardiology at discharge if indicated.   3.  History of iron deficiency anemia.  The patient's hemoglobin today is presently within normal limits at 13.3.  Continued monitoring as an outpatient.   4.  Elevated blood pressure.  The patient reportedly with a remote history of hypertension; however, per primary care note from 2015, this had resolved at that time. Patient's blood pressure today is elevated at 164/91 and may be a stress response.  We will continue to monitor.  The patient will have p.r.n. hydralazine available for systolic blood pressure greater than 180.  Otherwise, will defer to outpatient setting pending evaluation as above.   5.  Deep venous thrombosis prophylaxis:  Ambulation as I anticipate short stay.       The patient was staffed with Dr. Jonathan Coats who independently interviewed and evaluated patient and is in agreement with the above-mentioned plan.         JONATHAN COATS MD       As dictated by TELLY RANDLE PA-C            D: 2017 16:35   T: 2017 17:37   MT: TYLER      Name:     EMA VILA   MRN:      [id number]        Account:      [id number]   :      1928           Admitted:     [id number]      Document: [id number]       cc: Johnny Tafoya MD

## 2017-03-16 NOTE — IP AVS SNAPSHOT
"    [place] [address] [facility] UNIT: [phone]                                              INTERAGENCY TRANSFER FORM - LAB / IMAGING / EKG / EMG RESULTS   3/16/2017                    Hospital Admission Date: 3/16/2017  EMA VILA   : 1928  Sex: Male        Attending Provider: Jonathan Coats MD     Allergies:  No Known Allergies    Infection:  None   Service:  HOSPITALIST    Ht:  1.88 m (6' 2\")   Wt:  83.5 kg (184 lb)   Admission Wt:  93 kg (205 lb)    BMI:  23.62 kg/m 2   BSA:  2.09 m 2            Patient PCP Information     Provider PCP Type    Johnny Tafoya MD, MD General         Lab Results - 3 Days      T4 free [[id number]]  Resulted: 17 1802, Result status: Final result    Ordering provider: Raquel Long MD  17 0840 Resulting lab: [facility]    Specimen Information    Type Source Collected On     17 0840          Components       Value Reference Range Flag Lab   T4 Free 0.93 0.76 - 1.46 ng/dL  FrStHsLb            Vitamin B12 [[id number]] (Abnormal)  Resulted: 17 1346, Result status: Final result    Ordering provider: Raquel Long MD  17 0000 Resulting lab: [facility]    Specimen Information    Type Source Collected On   Blood  17 0840          Components       Value Reference Range Flag Lab   Vitamin B12 163 193 - 986 pg/mL L 51            Folate [[id number]]  Resulted: 17 1328, Result status: Final result    Ordering provider: Raquel Long MD  17 0000 Resulting lab: [facility]    Specimen Information    Type Source Collected On   Blood  17 0840          Components       Value Reference Range Flag Lab   Folate 8.2 >5.4 ng/mL  51            TSH [[id number]] (Abnormal)  Resulted: 17 0918, Result status: Final result    Ordering provider: Raquel Long MD  17 0000 Resulting lab: [facility]    Specimen " Information    Type Source Collected On   Blood  03/20/17 0840          Components       Value Reference Range Flag Lab   TSH 5.91 0.40 - 4.00 mU/L H FrStHsLb            CBC with platelets [[id number]] (Abnormal)  Resulted: 03/20/17 0851, Result status: Final result    Ordering provider: Raquel Long MD  03/20/17 0000 Resulting lab: [facility]    Specimen Information    Type Source Collected On   Blood  03/20/17 0840          Components       Value Reference Range Flag Lab   WBC 4.5 4.0 - 11.0 10e9/L  FrStHsLb   RBC Count 4.12 4.4 - 5.9 10e12/L L FrStHsLb   Hemoglobin 12.1 13.3 - 17.7 g/dL L FrStHsLb   Hematocrit 37.4 40.0 - 53.0 % L FrStHsLb   MCV 91 78 - 100 fl  FrStHsLb   MCH 29.4 26.5 - 33.0 pg  FrStHsLb   MCHC 32.4 31.5 - 36.5 g/dL  FrStHsLb   RDW 13.6 10.0 - 15.0 %  FrStHsLb   Platelet Count 133 150 - 450 10e9/L L FrStHsLb            Testing Performed By     Lab - Abbreviation Name Director Address Valid Date Range    14 - FrStHsLb [facility] Unknown [address] [place] [address] 05/08/15 1057 - Present    51 - Unknown [facility] Unknown [address] [facility] [address] 12/31/14 1010 - Present            Unresulted Labs     None         Imaging Results - 3 Days      CT Head w/o Contrast [[id number]]  Resulted: 03/21/17 1553, Result status: Preliminary result    Ordering provider: Raquel Long MD  03/21/17 1443 Resulted by: Gamal Norton MD    Performed: 03/21/17 1530 - 03/21/17 1548 Resulting lab: RADIOLOGY RESULTS    Narrative:       CT SCAN OF THE HEAD WITHOUT CONTRAST   3/21/2017 3:48 PM     HISTORY: Dementia.    TECHNIQUE: Axial images of the head and coronal reformations without  IV contrast material. Radiation dose for this scan was reduced using  automated exposure control, adjustment of the mA and/or kV according  to patient size, or iterative reconstruction technique.    COMPARISON: None.    FINDINGS: Moderate  cerebral atrophy is present. There are some minimal  patchy white matter changes in both hemispheres without mass effect.  There is no evidence for intracranial hemorrhage, mass effect, acute  infarct, or a skull fracture. Vascular calcifications are noted at the  skull base.      Impression:       IMPRESSION: Chronic changes. No evidence for intracranial hemorrhage  or any acute process.      Testing Performed By     Lab - Abbreviation Name Director Address Valid Date Range    104 - Rad Rslts RADIOLOGY RESULTS Unknown Unknown 02/16/05 1553 - Present            Encounter-Level Documents:     There are no encounter-level documents.      Order-Level Documents:     There are no order-level documents.

## 2017-03-16 NOTE — ED NOTES
Bed: ED17  Expected date:   Expected time:   Means of arrival:   Comments:  434 - 59m transport hold eta 1153

## 2017-03-16 NOTE — IP AVS SNAPSHOT
"[place] [address] [facility] SPECIALTY UNIT: [phone]                                              INTERAGENCY TRANSFER FORM - PHYSICIAN ORDERS   3/16/2017                    Hospital Admission Date: 3/16/2017  EMA VILA   : 1928  Sex: Male        Attending Provider: Jonathan Coats MD     Allergies:  No Known Allergies    Infection:  None   Service:  HOSPITALIST    Ht:  1.88 m (6' 2\")   Wt:  83.5 kg (184 lb)   Admission Wt:  93 kg (205 lb)    BMI:  23.62 kg/m 2   BSA:  2.09 m 2            Patient PCP Information     Provider PCP Type    Johnny Tafoya MD, MD General      ED Clinical Impression     Diagnosis Description Comment Added By Time Added    Failure to thrive in adult [R62.7] Failure to thrive in adult [R62.7]  Jean Eaton APRN CNP 3/16/2017  2:21 PM    Memory difficulties [R41.3] Memory difficulties [R41.3]  Jean Eaton APRN CNP 3/16/2017  2:21 PM    Impaired mobility [Z74.09] Impaired mobility [Z74.09]  Jean Eaton APRN CNP 3/16/2017  2:21 PM      Hospital Problems as of 3/21/2017              Priority Class Noted POA    Social problem Medium  3/16/2017 Yes      Non-Hospital Problems as of 3/21/2017              Priority Class Noted    HTN (hypertension) Medium  2015    Mixed hyperlipidemia Medium  2015    Type II or unspecified type diabetes mellitus with peripheral circulatory disorders, not stated as uncontrolled(250.70) Medium  2015    Peripheral neuropathy (H) Medium  2015    Heart murmur Medium  2015    Aortic valve sclerosis Medium  2015    Aortic valve stenosis-severe Medium  2015    DDD (degenerative disc disease) Medium  2015      Code Status History     Date Active Date Inactive Code Status Order ID Comments User Context    This patient has a current code status but no historical code status.         Medication Review      START taking        Dose / Directions Comments    cyanocobalamin 1000 MCG/ML injection "   Commonly known as:  VITAMIN B12   Used for:  Vitamin B12 deficiency (non anemic)        Dose:  1000 mcg   Inject 1 mL (1,000 mcg) into the muscle daily for 6 days Then once weekly for 4 weeks, then once monthly.   Quantity:  6 mL   Refills:  0                After Care     Activity - Up with assistive device           Advance Diet as Tolerated       Regular Diet Adult       Follow Up (Transitional Care Management)       Follow up with primary care provider, Johnny Tafoya MD, within 7-14 days to evaluate medication change and for hospital follow- up.  The following labs/tests are recommended: B12 level in 1 month, TSH in 3 months.      Appointments on [place] and/or [facility] (with [facility] or [employer] provider or service). Call [phone] if you haven't heard regarding these appointments within 7 days of discharge.       General info for SNF       Length of Stay Estimate: Long Term Care  Condition at Discharge: Stable  Level of care:skilled   Rehabilitation Potential N/A  Admission H&P remains valid and up-to-date: Yes  Recent Chemotherapy: N/A  Use Nursing Home Standing Orders: Yes       Mantoux instructions       Give two-step Mantoux (PPD) Per Facility Policy Yes             Referrals     Occupational Therapy Adult Consult       Evaluate and treat as clinically indicated.    Reason:  Late-onset dementia, assess appropriate level of care.       Physical Therapy Adult Consult       Evaluate and treat as clinically indicated.  Reason:  Assess appropriate level of care             Statement of Approval     Ordered          03/21/17 1653  I have reviewed and agree with all the recommendations and orders detailed in this document.  EFFECTIVE NOW     Approved and electronically signed by:  Raquel Long MD

## 2017-03-16 NOTE — IP AVS SNAPSHOT
[place] [address] [facility] Special[facility] Unit    [address] [place] [address]    Phone:  [phone]                                       After Visit Summary   3/16/2017    Bhaskar Hope    MRN: [id number]           After Visit Summary Signature Page     I have received my discharge instructions, and my questions have been answered. I have discussed any challenges I see with this plan with the nurse or doctor.    ..........................................................................................................................................  Patient/Patient Representative Signature      ..........................................................................................................................................  Patient Representative Print Name and Relationship to Patient    ..................................................               ................................................  Date                                            Time    ..........................................................................................................................................  Reviewed by Signature/Title    ...................................................              ..............................................  Date                                                            Time
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
## 2017-03-16 NOTE — ED NOTES
While starting IV and conversing with patient it was noted that patient has severe short term memory loss. He asked me the same social questions (where I grew up, where I went to , etc.) repeatedly in just a few minutes.
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
## 2017-03-16 NOTE — ED NOTES
"Pt assisted back into bed, continues to question why he is here.  Pt again reminded that he will be staying in the hospital for observation, which he states \"is a waste of taxpayer dollars\".    "

## 2017-03-16 NOTE — PHARMACY-ADMISSION MEDICATION HISTORY
Admission medication history interview status for the 3/16/2017  admission is complete. See EPIC admission navigator for prior to admission medications     Medication history source reliability:Good    Actions taken by pharmacist (provider contacted, etc): spoke with patient. Also called [facility] where patient says he fills and they haven't filled anything for him in the last 18 months.    Additional medication history information not noted on PTA med list : pt denied taking any medications. An occasional vitamin but nothing regularly.     Medication reconciliation/reorder completed by provider prior to medication history? No    Time spent in this activity: 10 minutes    Prior to Admission medications    Not on File       Mirna Lezama, PharmD

## 2017-03-16 NOTE — ED NOTES
Talked with NP report that son called form [place] and is aware he is here, ask if SW should be involved.  Plan is to admit pt have some cognitive workup and have  find placement for pt.

## 2017-03-16 NOTE — IP AVS SNAPSHOT
MRN:[id number]                      After Visit Summary   3/16/2017    Bhaskar Hope    MRN: [id number]           Thank you!     Thank you for choosing [facility] for your care. Our goal is always to provide you with excellent care. Hearing back from our patients is one way we can continue to improve our services. Please take a few minutes to complete the written survey that you may receive in the mail after you visit with us. Thank you!        Patient Information     Date Of Birth          2/28/1928        About your hospital stay     You were admitted on:  March 16, 2017 You last received care in the:  [place] [address] [facility]    You were discharged on:  March 21, 2017       Who to Call     For medical emergencies, please call 911.  For non-urgent questions about your medical care, please call your primary care provider or clinic, [phone]          Attending Provider     Provider Specialty    Jean Eaton APRN CNP Family Practice    Jonathan Coats MD Internal Medicine       Primary Care Provider Office Phone # Fax #    Johnny Tafoya [phone]       [facility] INTERNAL MEDIC [address]  [facility] [address]        After Care Instructions     Activity - Up with assistive device           Advance Diet as Tolerated       Regular Diet Adult            Follow Up (Transitional Care Management)       Follow up with primary care provider, Johnny Tafoya MD, within 7-14 days to evaluate medication change and for hospital follow- up.  The following labs/tests are recommended: B12 level in 1 month, TSH in 3 months.      Appointments on [place] and/or [facility] (with [facility] or [facility] provider or service). Call [phone] if you haven't heard regarding these appointments within 7 days of discharge.            General info for SNF       Length of Stay Estimate: Long Term Care  Condition at Discharge: Stable  Level of care:skilled  "  Rehabilitation Potential N/A  Admission H&P remains valid and up-to-date: Yes  Recent Chemotherapy: N/A  Use Nursing Home Standing Orders: Yes            Mantoux instructions       Give two-step Mantoux (PPD) Per Facility Policy Yes                  Additional Services     Occupational Therapy Adult Consult       Evaluate and treat as clinically indicated.    Reason:  Late-onset dementia, assess appropriate level of care.            Physical Therapy Adult Consult       Evaluate and treat as clinically indicated.  Reason:  Assess appropriate level of care                  Pending Results     Date and Time Order Name Status Description    3/21/2017 1443 CT Head w/o Contrast Preliminary             Statement of Approval     Ordered          03/21/17 2245  I have reviewed and agree with all the recommendations and orders detailed in this document.  EFFECTIVE NOW     Approved and electronically signed by:  Raquel Long MD             Admission Information     Date & Time Provider Department Dept. Phone    3/16/2017 Jonathan Coats MD [place] [address] [facility] [phone]      Your Vitals Were     Blood Pressure Pulse Temperature Respirations Height Weight    107/58 (BP Location: Right arm) 70 98.1  F (36.7  C) (Oral) 20 1.88 m (6' 2\") 83.5 kg (184 lb)    Pulse Oximetry BMI (Body Mass Index)                97% 23.62 kg/m2          [employer] Information     [facility] lets you send messages to your doctor, view your test results, renew your prescriptions, schedule appointments and more. To sign up, go to [email or web address] . Click on \"Log in\" on the left side of the screen, which will take you to the Welcome page. Then click on \"Sign up Now\" on the right side of the page.     You will be asked to enter the access code listed below, as well as some personal information. Please follow the directions to create your username and password.     Your access code is: [email or web address]  Expires: 6/13/2017 "  4:59 PM     Your access code will  in 90 days. If you need help or a new code, please call your [facility] or [phone].        Care EveryWhere ID     This is your Care EveryWhere ID. This could be used by other organizations to access your [facility] medical records  [id number]           Review of your medicines      START taking        Dose / Directions    cyanocobalamin 1000 MCG/ML injection   Commonly known as:  VITAMIN B12   Used for:  Vitamin B12 deficiency (non anemic)        Dose:  1000 mcg   Inject 1 mL (1,000 mcg) into the muscle daily for 6 days Then once weekly for 4 weeks, then once monthly.   Quantity:  6 mL   Refills:  0            Where to get your medicines      These medications were sent to [facility] [address] [facility] [address] [facility]  [address] [facility] [address]     Phone:  [phone]     cyanocobalamin 1000 MCG/ML injection                Protect others around you: Learn how to safely use, store and throw away your medicines at www.disposemymeds.org.             Medication List: This is a list of all your medications and when to take them. Check marks below indicate your daily home schedule. Keep this list as a reference.      Medications           Morning Afternoon Evening Bedtime As Needed    cyanocobalamin 1000 MCG/ML injection   Commonly known as:  VITAMIN B12   Inject 1 mL (1,000 mcg) into the muscle daily for 6 days Then once weekly for 4 weeks, then once monthly.   Last time this was given:  1,000 mcg on 3/21/2017  8:37 AM

## 2017-03-17 ENCOUNTER — APPOINTMENT (OUTPATIENT)
Dept: PHYSICAL THERAPY | Facility: CLINIC | Age: 82
End: 2017-03-17
Attending: PHYSICIAN ASSISTANT
Payer: MEDICARE

## 2017-03-17 PROCEDURE — G0378 HOSPITAL OBSERVATION PER HR: HCPCS

## 2017-03-17 PROCEDURE — 99207 ZZC CDG-HISTORY COMPONENT: MEETS DETAILED - DOWN CODED LACK OF PFSH: CPT | Performed by: PSYCHIATRY & NEUROLOGY

## 2017-03-17 PROCEDURE — 97161 PT EVAL LOW COMPLEX 20 MIN: CPT | Mod: GP

## 2017-03-17 PROCEDURE — 40000193 ZZH STATISTIC PT WARD VISIT

## 2017-03-17 PROCEDURE — 99221 1ST HOSP IP/OBS SF/LOW 40: CPT | Performed by: PSYCHIATRY & NEUROLOGY

## 2017-03-17 PROCEDURE — 99207 ZZC CDG-UP CODE -MED NECESSITY: CPT | Performed by: INTERNAL MEDICINE

## 2017-03-17 PROCEDURE — 99225 ZZC SUBSEQUENT OBSERVATION CARE,LEVEL II: CPT | Performed by: INTERNAL MEDICINE
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
## 2017-03-17 NOTE — PLAN OF CARE
"Problem: Goal Outcome Summary  Goal: Goal Outcome Summary  PT: Orders received, evaluation completed. Patient currently admitted under observation status. 89 year old male admitted for a welfare check. Per H&P neighbors called as patient was having difficulty caring for himself at home.   Patient reports he is \"very independent\" at home. Unreliable historian this session. Reports he does not use an AD, then states he uses a cane, states he owns a walker and then adamantly declines. Patient denies his neighbors assist (however this is documented in the H&P). On evaluation patient needs variable assist for sit to/from stand transfer (CGA from bed/toilet and mod A from recliner chair). Patient ambulates ~10 feet with bilateral UE support on straight cane - needs additional mod A - very unsteady. Ambulates ~50 feet and several short distances with FWW and min A. Patient needs constant verbal cues for safe walker use and to remember to use.  Currently patient demonstrates strength, balance and cognition deficits. Patient will need 24 hour supervision/assistance or TCU on discharge. Secondary to safety/cognition concerns patient will likely ultimately need LTC placement. Will complete PT order at this time as patient is under observation status. Please re-consult if there is a change in status.     Recommend all staff to use FWW with patient - very unsteady with use of cane.   "

## 2017-03-17 NOTE — PLAN OF CARE
Problem: Goal Outcome Summary  Goal: Goal Outcome Summary  Outcome: No Change  Pt up with assist of one and walker.  Unsteady on feet.  A&Ox2 confused to place, part of date and situation. forgetful  Appetite good.  Voiding well.  BM x1.   Skin dry/brown scaly on legs and chest.   Poor insight- wants to go home, states he gets around fine at home.   Awaiting psych consult.

## 2017-03-17 NOTE — PLAN OF CARE
"Problem: Goal Outcome Summary  Goal: Goal Outcome Summary  Outcome: No Change  Pt is A&O to self.  B/P elevated, all other VSS on RA.  Pt denies pain, denies hx of illness, frequently requests to be told why he is here.  Pt shows lack of insight into the level of assistance he needs with ADLs, stating \"I only need help [with walking] here because my hip is out of wack. It's been like that for years.\"  Very unsteady on feet, A1 with ambulation. Behavior at times is inappropriate towards staff, frequent requests for women and beer.        "

## 2017-03-17 NOTE — PLAN OF CARE
"Problem: Goal Outcome Summary  Goal: Goal Outcome Summary  Outcome: No Change  Pt alert to self only, disoriented to time/place/situation. C/O pain in Rt hip that is chronic, declined medication. Pt states he \"should not be here\" and \"will be going home tomorrow\". Pt has poor insight and believes he is completley independent. Pt frequently repeats questions, asks several times in a row. Skin is intact but there is layers of dead brown/black skin over torso and Lower extremities. Pt is incontinent at times, uses cane at home. SW involved. Reg diet. Sitter at bedside. Continue to monitor.           "

## 2017-03-17 NOTE — PROGRESS NOTES
"   03/17/17 0900   Quick Adds   Type of Visit Initial PT Evaluation   Living Environment   Lives With alone   Living Arrangements house   Number of Stairs to Enter Home 0  (back entrance)   Number of Stairs Within Home 12  (one rail to basement)   Transportation Available car  (Per pt, he drives)   Living Environment Comment Patient reports his neighbors do not assist. He is \"very independent\". Does recieve meals on wheels.    Self-Care   Equipment Currently Used at Home cane, straight;walker, rolling   Functional Level Prior   Ambulation 0-->independent   Transferring 0-->independent   Toileting 0-->independent   Bathing 0-->independent   Dressing 0-->independent   Cognition 1 - attention or memory deficits   Fall history within last six months no   Prior Functional Level Comment Patient reports he is \"very independent\" at home. Unreliable historian this session. Reports he does not use an AD, then states he uses a cane, states he owns a walker and then adamantly declines. Patient denies his neighbors assist (however this is documented in the H&P).    General Information   Onset of Illness/Injury or Date of Surgery - Date 03/16/17   Referring Physician MARYAM Thurman   Patient/Family Goals Statement Return home   Pertinent History of Current Problem (include personal factors and/or comorbidities that impact the POC) 89 year old male admitted for a welfare check. Per H&P neighbors called as patient was having difficulty caring for himself at home.    Precautions/Limitations fall precautions   General Info Comments Activity: ambulate with assist   Cognitive Status Examination   Orientation person;place  (Difficulty with date, difficulty with safety questions)   Level of Consciousness alert;confused   Follows Commands and Answers Questions 75% of the time;able to follow single-step instructions   Personal Safety and Judgment impaired;impulsive;at risk behaviors demonstrated  (Decreased insight into safety deficits) "   Cognitive Comment (Per chat, psychiartry consulted. )   Pain Assessment   Patient Currently in Pain No   Posture    Posture Forward head position;Protracted shoulders;Kyphosis  (Flexed knees and flexed trunk)   Strength   Strength Comments Needs intermittent physical assist to complete mobility.    Bed Mobility   Bed Mobility Comments Supine to/from sit with SBA.    Transfer Skills   Transfer Comments Intermittent assist needed for transfers. Sit to/from stand with mod A from recliner chair. Min A to stand from bed. CGA from recliner chair and toilet.    Gait   Gait Comments Patient ambulates ~10 feet with bilateral UE support on straight cane - needs additional mod A - very unsteady. Ambulates ~50 feet and several short distances with FWW and min A. Patient needs constant verbal cues for safe walker use and to remember to use.    Balance   Balance Comments Standing balance poor. Definite need for UE support on FWW.    Sensory Examination   Sensory Perception Comments Denies burning, numbness and tingling.    Coordination   Coordination no deficits were identified   Clinical Impression   Criteria for Skilled Therapeutic Intervention evaluation only   PT Diagnosis Impaired gait   Influenced by the following impairments Decreased activity tolerance, impaired balance   Functional limitations due to impairments Decreased independence with transfers and gait   Clinical Presentation Stable/Uncomplicated   Clinical Presentation Rationale Patient currently limited by poor safety awareness, strength and balance. Patient has limited insight into current deficits. Stable clinical appearance however.    Clinical Decision Making (Complexity) Low complexity   Anticipated Discharge Disposition (24 hour supervision/assist)   Risk & Benefits of therapy have been explained Yes   Patient, Family & other staff in agreement with plan of care Yes   Clinical Impression Comments Patient appropriate for PT evaluation only as he is under  "observation status to determine DC needs.    [facility] TM \"6 Clicks\"   2016, Trustees of [facility], under license to [employer].  All rights reserved.   6 Clicks Short Forms Basic Mobility Inpatient Short Form   [facility]  \"6 Clicks\" V.2 Basic Mobility Inpatient Short Form   1. Turning from your back to your side while in a flat bed without using bedrails? 3 - A Little   2. Moving from lying on your back to sitting on the side of a flat bed without using bedrails? 3 - A Little   3. Moving to and from a bed to a chair (including a wheelchair)? 3 - A Little   4. Standing up from a chair using your arms (e.g., wheelchair, or bedside chair)? 3 - A Little   5. To walk in hospital room? 2 - A Lot   6. Climbing 3-5 steps with a railing? 1 - Total   Basic Mobility Raw Score (Score out of 24.Lower scores equate to lower levels of function) 15   Total Evaluation Time   Total Evaluation Time (Minutes) 20     "

## 2017-03-17 NOTE — PROGRESS NOTES
[facility]  Hospitalist Progress Note  Elizabeth Lorenzana MD  03/17/2017    Assessment & Plan   Bhaskar Hope is an 89-year-old male with past medical history of hip osteoarthritis, severe aortic stenosis and iron deficiency anemia who presented to the Emergency Department following recheck performed by a [facility]  and finding the house in disarray and concern over patient's home safety and inability to care for himself at home and he is admitted under observation for evaluation and possible placement.     Inability to care for self/ vulnerable adult   See H & P for detail.  He was found to have a house in disarray with minimal oral intake and has unknowingly been reliant on his neighbors for food and care over the past year. He reports that he is continuing to drive and that he goes to all of his appointments and does all of his grocery shopping; however, the reality is that his neighbors have disconnected his car battery in 06/2016 and he has not seen a primary care provider in at least 1 year and does not keep up on any of his grocery shopping. It appears that he has limited mobility secondary to his right hip osteoarthritis and there is some concern about his inability to ambulate and care for herself at home.   - no formal diagnosis of memory problems or cognitive impairment, but during interview he displays problem with short memory   - Due to patient's ongoing memory issues and persistent desire to return home with the belief that he is managing extremely well, we will ask Psychiatry to evaluate for competency, currently pending  - PT/OT evaluation  - SW also following for safe discharge planning, he will definitely need placement or 24 hrs supervision      Severe aortic stenosis.   He denies any dizziness or lightheadedness, albeit he has also not been ambulating very much.  - will monitor at this time       History of iron deficiency anemia.   hgb 13.3 during admission.     Hx  "of HTN: has not been on meds for this. It seems like this was felt to be resolved in 2015.  - would not be aggressive in lowering BP in light of his severe AS    DVT Prophylaxis: ambulatio    Disposition: pending psych eval and placement    Interval History   During visit patient asks same questions over and over again.  He thinks he is been in hospital for ~4 days and wants to discharge home. He states he takes care of himself well at home.     -Data reviewed today: I reviewed all new labs and imaging over the last 24 hours. I personally reviewed no images or EKG's today.    Physical Exam    , Blood pressure 113/75, pulse 91, temperature 98.7  F (37.1  C), temperature source Oral, resp. rate 17, height 1.88 m (6' 2\"), weight 83.5 kg (184 lb), SpO2 98 %.  Vitals:    03/16/17 1201 03/16/17 1900   Weight: 93 kg (205 lb) 83.5 kg (184 lb)     Vital Signs with Ranges  Temp:  [98.3  F (36.8  C)-98.7  F (37.1  C)] 98.7  F (37.1  C)  Pulse:  [70-92] 91  Resp:  [16-17] 17  BP: (113-180)/() 113/75  SpO2:  [97 %-100 %] 98 %  I/O's Last 24 hours  I/O last 3 completed shifts:  In: 1160 [P.O.:1160]  Out: 1650 [Urine:1650]    Constitutional: Alert, awake and no apparent distress   Respiratory: Clear to auscultation bilaterally  Cardiovascular: RRR, systolic murmur in the RUSB  GI: soft, Nontender, non-distended  Skin/Integumen: warm and dry.   Ext: no LE edema bilaterally    Medications   All medications I am responsible for were reviewed.           Data     Recent Labs  Lab 03/16/17  1240   WBC 3.2*   HGB 13.3   MCV 90   *      POTASSIUM 3.8   CHLORIDE 108   CO2 23   BUN 16   CR 1.01   ANIONGAP 9   VINH 8.7   *   ALBUMIN 3.4   PROTTOTAL 7.2   BILITOTAL 1.2   ALKPHOS 110   ALT 11   AST 15       No results found for this or any previous visit (from the past 24 hour(s)).  "

## 2017-03-17 NOTE — PLAN OF CARE
Problem: Goal Outcome Summary  Goal: Goal Outcome Summary  Outcome: No Change  Up with assist of 1 in chair. Repeats requests often, states he feels he does not need to be here and can safely return home. Alert to person and place.

## 2017-03-18 PROCEDURE — 99225 ZZC SUBSEQUENT OBSERVATION CARE,LEVEL II: CPT | Performed by: INTERNAL MEDICINE

## 2017-03-18 PROCEDURE — 99207 ZZC CDG-CODE CATEGORY CHANGED: CPT | Performed by: INTERNAL MEDICINE

## 2017-03-18 PROCEDURE — G0378 HOSPITAL OBSERVATION PER HR: HCPCS

## 2017-03-18 NOTE — PLAN OF CARE
Problem: Goal Outcome Summary  Goal: Goal Outcome Summary  Outcome: No Change  VSS on RA, oriented to self, up with 1 and walker. Mildly unsteady on his feet when using the urinal. Voiding appropriately. Tolerating regular diet. Denies pain. Confused about the time of day r/t active roommate. Sleeping restlessly.

## 2017-03-18 NOTE — PROGRESS NOTES
[facility]    Hospitalist Progress Note    Date of Service (when I saw the patient): 03/18/2017    Assessment & Plan   Bhaskar Hope is an 89-year-old male with past medical history of hip osteoarthritis, severe aortic stenosis and iron deficiency anemia who presented to the Emergency Department following recheck performed by a [facility]  and finding the house in disarray and concern over patient's home safety and inability to care for himself at home and he is admitted under observation for evaluation and possible placement.      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 He was found to have a house in disarray with minimal oral intake and has unknowingly been reliant on his neighbors for food and care over the past year. He reports that he is continuing to drive and that he goes to all of his appointments and does all of his grocery shopping; however, the reality is that his neighbors have disconnected his car battery in 06/2016 and he has not seen a primary care provider in at least 1 year and does not keep up on any of his grocery shopping. It appears that he has limited mobility secondary to his right hip osteoarthritis and there is some concern about his inability to ambulate and care for herself at home.   - No formal diagnosis of memory problems or cognitive impairment, but during interview he displays problem with short memory   - Due to patient's ongoing memory issues and persistent desire to return home with the belief that he is managing extremely well, Psychiatry was asked to evaluate for competency, and agree exam is consistent with dementia   - PT recommending home with 24 hour supervision or TCU.   - SW also following for safe discharge planning, he will definitely need placement or 24 hrs supervision  - Check TSH, B12 in AM  - CT scan of the head as outpatient. No focal deficits.     WBC mildly low at 3.2 and  "platlets 126 @ admission)  - No sign of infection.   - recheck CBC in AM     Severe aortic stenosis.   He denies any dizziness or lightheadedness, albeit he has also not been ambulating very much.  - will monitor at this time     History of iron deficiency anemia.   hgb 13.3 during admission.      Hx of HTN: has not been on meds for this since 2015.   - would not be aggressive in lowering BP in light of his severe AS  - BP controlled.      DVT Prophylaxis: ambulatio     Disposition: pending safe placement plan.     Raquel Long  [phone] (p)  Text Page (7AM - 6PM)     Interval History   Met with patient, unable to tell me the name of the hospital, but knows he's in the hospital. Does not know the month, day of the week, Governor, president. He persists is saying that he keeps his house \"as neat as a pin.\" Very pleasant.     -Data reviewed today: I reviewed all new labs and imaging results over the last 24 hours. I personally reviewed no images or EKG's today.    Physical Exam   Temp: 97.9  F (36.6  C) Temp src: Oral BP: 107/66 Pulse: 76   Resp: 19 SpO2: 97 % O2 Device: None (Room air)    Vitals:    03/16/17 1201 03/16/17 1900   Weight: 93 kg (205 lb) 83.5 kg (184 lb)     Vital Signs with Ranges  Temp:  [97.3  F (36.3  C)-97.9  F (36.6  C)] 97.9  F (36.6  C)  Pulse:  [76-86] 76  Resp:  [18-19] 19  BP: (105-122)/(66-83) 107/66  SpO2:  [96 %-98 %] 97 %  I/O last 3 completed shifts:  In: 960 [P.O.:960]  Out: 825 [Urine:825]    Constitutional:  NAD,   Neuropsyche: alert, oriented to being in hospital, but which one, doesn't know day of the week or month, answers questions appropriately. Face symmetric, CN intact, Speech  Normal. Good strength in upper and lower extremities, but reduced range of motion of right hip flexor due to pain. Patient states he's had a bad right hip. FNF intact.   Respiratory: Breathing comfortably, good air exchange, no wheezes, no crackles.   Cardiovascular:  Regular rate and rhythm with " systolic murmur. no edema.  GI:  soft, NT/ND, BS normal  Skin/Integumen:  No acute rash or sign of bleeding.     Medications   All medications reviewed on 3/18   No scheduled medications currently.           Data     Recent Labs  Lab 03/16/17  1240   WBC 3.2*   HGB 13.3   MCV 90   *      POTASSIUM 3.8   CHLORIDE 108   CO2 23   BUN 16   CR 1.01   ANIONGAP 9   VINH 8.7   *   ALBUMIN 3.4   PROTTOTAL 7.2   BILITOTAL 1.2   ALKPHOS 110   ALT 11   AST 15       No results found for this or any previous visit (from the past 24 hour(s)).

## 2017-03-18 NOTE — PLAN OF CARE
Problem: Goal Outcome Summary  Goal: Goal Outcome Summary  Outcome: No Change  Pt is alert to self and place, very forgetful, had 100% of breakfast and lunch, assist of 1, walks hallway, awaits placement, on room air, good oxygenation, declines medication for right hip pain. Continue to monitor.

## 2017-03-18 NOTE — PLAN OF CARE
"Problem: Goal Outcome Summary  Goal: Goal Outcome Summary  Outcome: No Change  Pt Alert to self and knows he's at a hospital in [place]. Up Ax1 with cane or GB and walker. Incontinent at times. VSS. CMS intact. Skin dry and flaky. Poor insight. Ready to go home \"tomorrow\". Psych consult. Will cont to monitor.       "

## 2017-03-18 NOTE — PLAN OF CARE
Problem: Goal Outcome Summary  Goal: Goal Outcome Summary  OT: Orders rec'd and chart reviewed. Pt admitted under observation status for inability to care for self at home when had a welfare check. OT ordered for cognitive assessment. Per chart, psychiatry saw pt and performed MMSE with score of 11/30 and PT recommending home with 24 hr supervision and A or TCU. Spoke with SW as psychiatry already completed cognitive screen and also recommending nursing home placement. Defer OT to next level of care and complete inpt OT orders.

## 2017-03-18 NOTE — PLAN OF CARE
"Problem: Goal Outcome Summary  Goal: Goal Outcome Summary  Outcome: No Change  -vital signs normal or at patient baseline:met, stable  -safe disposition plan has been identified:pending, SW following  -psych eval:met     Ambulated hallways with assist of one and walker. Toe nails long and curved; foot soaked, washed and toe nails care done, unable to trim right great toe much as it is too thick, left a sticky note for provider to perhaps have podiatry follow up as patient stating he can get to it with a knife. Patient appreciative of cares and apologizing multiple times for his \"dirty feet\".       "

## 2017-03-18 NOTE — CONSULTS
"PSYCHIATRIC CONSULTATION      DATE OF CONSULTATION:  03/17/2017      REQUESTING PHYSICIAN:  Jesus Thurman PA-C      REASON FOR CONSULTATION:  Competency.      IDENTIFICATION:  Mr. Bhaskar Hope is an 89-year-old  male, possibly , who lives in [place].  No prior psychiatric exams.  No prior chemical dependency treatment, admitted after a vulnerable adult evaluation by EMS.      HISTORY OF PRESENT ILLNESS:  Mr. Hope states he feels fine.  He denies feeling depressed or down at all.  Does not have any psychiatric symptoms, denies obsessions, compulsions or psychosis.  No panic disorder.  He thinks his memory is fine.  Apparently his neighbor noticed, had been assisting patient with groceries and making sure that he was eating and checking on him, then he noted that he had not taken his medications in at least a year.  \"I disconnected his car battery 06/2016 so he wouldn't drive anymore.\"  [employer]ult [employer] was called, they came after a primary care visit was missed.   noticed his house was in disarray, there was fecal matter stained on the mattress.  Patient apparently had been voiding in a bucket next to his chair; too difficult for him to ambulate to and from the bathroom.  Patient was disheveled, no signs of infection.      PAST PSYCHIATRIC HISTORY:  None.      PAST MEDICAL HISTORY:  He has a history of osteoarthritis, severe aortic stenosis, elevated blood pressure.  Also, significant weight loss 45 pounds.        ALLERGIES:  In old history, it lists that he has a penicillin rash.        MEDICAL REVIEW OF SYSTEMS:  A 10-point review of systems was performed and otherwise negative except for the history of present illness by Jonathan Coats MD and dictated by Jesus Thurman PA-C on 03/16/2017, reviewed by Dr. Kamara on 03/17/2017.  No changes.      PHYSICAL EXAMINATION:   VITAL SIGNS:  Temperature 97.7, heart rate 80, respiration 18, blood pressure 169/91, oxygen saturation 99%.    " "  SOCIAL HISTORY:  The patient states that he was born and raised in [place].  Said he was an only child.  Grew up with both parents.  Graduated from [employer].  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  He states that he is still  and lives with his wife.  At this point, patient does not have a wife in the house but he could not remember that.      MENTAL STATUS EXAMINATION:  Appearance:  Patient was sitting up in bed, dressed in hospital attire.  Appeared his stated age.  His attitude was cooperative.  He was oriented to person but not place or time.  His eye contact was fair.  His speech was regular rate and rhythm, normal volume and tone.  Language normal.  Psychomotor behavior normal.  Mood was euthymic.  Affect was congruent.  Thought process goal oriented and intact.  Thought content negative for suicidal ideation, negative for plan or intent, negative for obsessions, compulsions, or psychosis.  No looseness of associations.  Fund of knowledge impaired.  Insight impaired.  Judgment impaired.  Attention span and concentration poor.  Recent and remote memory poor.  Gait not tested.  A Folstein Mini Mental Status was performed by Dr. Kamara, scored 11/30, which is significant impairment.  When asked about current events, he did not know who the president was.  He thought it was maybe that \"black man\" was Heri Proctor, did not know that Jeremías Wright was currently president.      ASSESSMENT:  Mr. Hope is an 89-year-old either  or   male who is currently living alone in the community and he has significant memory impairment, is clearly in need of placement.  Welfare check came to look in on him and he had feces on his bed.  He was trying to pee in a bucket because he could not walk around.  He had not taken his " medications in over a year.  Neighbors were trying to take care of him by undoing his car battery.  He has very poor insight and wants to just go home and thinks he is just fine, cannot see why he should not be able to go there.  At this point, no phone numbers are listed for his children.  He does have an adult son apparently in [place], last name Herminia, pretty hard to find that name without a phone number, also has a son in [place].  His neighbor number is listed who had been consulted by the , went to see him for [employer].  The patient is clearly impaired and needs to be in placement.  Guardianship will likely need to be appointed on an emergency basis.      DIAGNOSIS:   Axis I:  Dementia, cognitive impairment without psychosis.      PLAN:   1.  Provide safe space stabilization.   2.  Get an emergency guardianship.   3.  The patient needs to be referred to nursing home placement.         JANESSA RODRIGUEZ MD             D: 2017 20:51   T: 2017 21:51   MT: TD      Name:     EMA VILA   MRN:      [id number]        Account:       [id number]   :      [id number]           Consult Date:  2017      Document: [id number]       cc: TELLY RANDLE PA-C

## 2017-03-19 PROCEDURE — 99225 ZZC SUBSEQUENT OBSERVATION CARE,LEVEL II: CPT | Performed by: INTERNAL MEDICINE

## 2017-03-19 PROCEDURE — G0378 HOSPITAL OBSERVATION PER HR: HCPCS

## 2017-03-19 PROCEDURE — 99207 ZZC CDG-CODE CATEGORY CHANGED: CPT | Performed by: INTERNAL MEDICINE

## 2017-03-19 NOTE — PLAN OF CARE
Problem: Goal Outcome Summary  Goal: Goal Outcome Summary  Outcome: No Change  VSS on RA, up with 1, forgetful at times. Pt resting comfortably this shift. Denies pain. Tolerating reg diet well. Saline locked.

## 2017-03-19 NOTE — PROGRESS NOTES
[facility]    Hospitalist Progress Note    Date of Service (when I saw the patient): 03/19/2017    Assessment & Plan   Bhaskar Hope is an 89-year-old male with past medical history of hip osteoarthritis, severe aortic stenosis and iron deficiency anemia who presented to the Emergency Department following recheck performed by a [facility]  and finding the house in disarray and concern over patient's home safety and inability to care for himself at home and he is admitted under observation for evaluation and possible placement.      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 He was found to have a house in disarray with minimal oral intake and has unknowingly been reliant on his neighbors for food and care over the past 1-2 years. He reports that he is continuing to drive and that he goes to all of his appointments and does all of his grocery shopping; however, the reality is that his neighbors have disconnected his car battery in 06/2016 and he has not seen a primary care provider in at least 1 year and does not keep up on any of his grocery shopping. It appears that he has limited mobility secondary to his right hip osteoarthritis and there is some concern about his inability to ambulate and care for herself at home.   - No formal diagnosis of memory problems or cognitive impairment, but during interview he displays problem with short memory   - Due to patient's ongoing memory issues and persistent desire to return home with the belief that he is managing extremely well, Psychiatry was asked to evaluate for competency, and agree exam is consistent with dementia   - PT recommending home with 24 hour supervision or TCU.   - SW also following for safe discharge planning, he will definitely need placement or 24 hrs supervision  - Check TSH, B12 in AM  - CT scan of the head as outpatient. No focal deficits.     WBC mildly low at 3.2  and platlets 126 @ admission)  - No sign of infection.   - recheck CBC in AM     Severe aortic stenosis.   He denies any dizziness or lightheadedness, albeit he has also not been ambulating very much.  - will monitor at this time     History of iron deficiency anemia.   hgb 13.3 during admission.      Hx of HTN: has not been on meds for this since 2015.   - would not be aggressive in lowering BP in light of his severe AS  - BP controlled.      DVT Prophylaxis: ambulatio     Disposition: pending safe placement plan.     Raquel Long  [phone] (p)  Text Page (7AM - 6PM)     Interval History   Patient continues to adamantly deny that he has any problem caring for himself. He is upset by continued hospitalization. No CP, SOB, light-headedness.     -Data reviewed today: I reviewed all new labs and imaging results over the last 24 hours. I personally reviewed no images or EKG's today.    Physical Exam   Temp: 97.8  F (36.6  C) Temp src: Oral BP: 127/80 Pulse: 71   Resp: 17 SpO2: 98 % O2 Device: None (Room air)    Vitals:    03/16/17 1201 03/16/17 1900   Weight: 93 kg (205 lb) 83.5 kg (184 lb)     Vital Signs with Ranges  Temp:  [97.8  F (36.6  C)-97.9  F (36.6  C)] 97.8  F (36.6  C)  Pulse:  [71-75] 71  Resp:  [17-18] 17  BP: (127-131)/(80-81) 127/80  SpO2:  [97 %-98 %] 98 %  I/O last 3 completed shifts:  In: 400 [P.O.:400]  Out: 775 [Urine:775]    Constitutional:  NAD,   Neuropsyche: alert, oriented to being in hospital, but not which one, doesn't know day of the week or month, answers questions appropriately, but answers do not appear to reflect reality from other reports. Face symmetric, CN intact, Speech  Normal. Good strength in upper and lower extremities, but reduced range of motion of right hip flexor due to pain in right hip.. FNF intact.   Respiratory: Breathing comfortably, good air exchange, no wheezes, no crackles.   Cardiovascular:  Regular rate and rhythm with systolic murmur. no edema.  GI:  soft,  NT/ND, BS normal  Skin/Integumen:  No acute rash or sign of bleeding.     Medications   All medications reviewed on 3/18   No scheduled medications currently.           Data     Recent Labs  Lab 03/16/17  1240   WBC 3.2*   HGB 13.3   MCV 90   *      POTASSIUM 3.8   CHLORIDE 108   CO2 23   BUN 16   CR 1.01   ANIONGAP 9   VINH 8.7   *   ALBUMIN 3.4   PROTTOTAL 7.2   BILITOTAL 1.2   ALKPHOS 110   ALT 11   AST 15       No results found for this or any previous visit (from the past 24 hour(s)).

## 2017-03-19 NOTE — PLAN OF CARE
Problem: Goal Outcome Summary  Goal: Goal Outcome Summary  Outcome: No Change  vital signs normal or at patient baseline:met, stable  -safe disposition plan has been identified:pending, SW following  -psych eval:met     More confused as the evening progressed. Pleasant. Ambulating to bathroom with standby assist and walker

## 2017-03-19 NOTE — PLAN OF CARE
Problem: Discharge Planning  Goal: Discharge Planning (Adult, OB, Behavioral, Peds)  Outcome: No Change  Pt is Alert and pleasantly confused. Up to bathroom ambulates the halls with A1, GB and walker. Unsteady on feet and a litle shaky. Tolerates meals. Scattered bruises noted on arms and legs.  Reading news papers. SW following for placement.

## 2017-03-20 LAB
ERYTHROCYTE [DISTWIDTH] IN BLOOD BY AUTOMATED COUNT: 13.6 % (ref 10–15)
FOLATE SERPL-MCNC: 8.2 NG/ML
HCT VFR BLD AUTO: 37.4 % (ref 40–53)
HGB BLD-MCNC: 12.1 G/DL (ref 13.3–17.7)
MCH RBC QN AUTO: 29.4 PG (ref 26.5–33)
MCHC RBC AUTO-ENTMCNC: 32.4 G/DL (ref 31.5–36.5)
MCV RBC AUTO: 91 FL (ref 78–100)
PLATELET # BLD AUTO: 133 10E9/L (ref 150–450)
RBC # BLD AUTO: 4.12 10E12/L (ref 4.4–5.9)
T4 FREE SERPL-MCNC: 0.93 NG/DL (ref 0.76–1.46)
TSH SERPL DL<=0.05 MIU/L-ACNC: 5.91 MU/L (ref 0.4–4)
VIT B12 SERPL-MCNC: 163 PG/ML (ref 193–986)
WBC # BLD AUTO: 4.5 10E9/L (ref 4–11)

## 2017-03-20 PROCEDURE — 96372 THER/PROPH/DIAG INJ SC/IM: CPT

## 2017-03-20 PROCEDURE — 84439 ASSAY OF FREE THYROXINE: CPT | Performed by: INTERNAL MEDICINE

## 2017-03-20 PROCEDURE — 99207 ZZC CDG-CODE CATEGORY CHANGED: CPT | Performed by: INTERNAL MEDICINE

## 2017-03-20 PROCEDURE — G0378 HOSPITAL OBSERVATION PER HR: HCPCS

## 2017-03-20 PROCEDURE — 25000128 H RX IP 250 OP 636: Performed by: INTERNAL MEDICINE

## 2017-03-20 PROCEDURE — 84443 ASSAY THYROID STIM HORMONE: CPT | Performed by: INTERNAL MEDICINE

## 2017-03-20 PROCEDURE — 99224 ZZC SUBSEQUENT OBSERVATION CARE,LEVEL I: CPT | Performed by: INTERNAL MEDICINE

## 2017-03-20 PROCEDURE — 82746 ASSAY OF FOLIC ACID SERUM: CPT | Performed by: INTERNAL MEDICINE

## 2017-03-20 PROCEDURE — 85027 COMPLETE CBC AUTOMATED: CPT | Performed by: INTERNAL MEDICINE

## 2017-03-20 PROCEDURE — 36415 COLL VENOUS BLD VENIPUNCTURE: CPT | Performed by: INTERNAL MEDICINE

## 2017-03-20 PROCEDURE — 82607 VITAMIN B-12: CPT | Performed by: INTERNAL MEDICINE

## 2017-03-20 RX ORDER — CYANOCOBALAMIN 1000 UG/ML
1000 INJECTION, SOLUTION INTRAMUSCULAR; SUBCUTANEOUS DAILY
Status: DISCONTINUED | OUTPATIENT
Start: 2017-03-20 | End: 2017-03-21 | Stop reason: HOSPADM

## 2017-03-20 RX ADMIN — CYANOCOBALAMIN 1000 MCG: 1000 INJECTION, SOLUTION INTRAMUSCULAR at 18:46

## 2017-03-20 NOTE — PLAN OF CARE
Problem: Goal Outcome Summary  Goal: Goal Outcome Summary  Outcome: Improving  Alert. Disoriented to situation, forgetful w/ STML. VSS. Denies pain. Regular diet. Up with assist of 1, GB, walker. Walked x1 this shift. D/c pending 24hr supervision or placement.

## 2017-03-20 NOTE — PLAN OF CARE
Problem: Goal Outcome Summary  Goal: Goal Outcome Summary  Outcome: No Change  Alert & oriented, forgetful at times,up with 1 assist/walker, denies pain, vital signs stable, discharge pending safe placement plan.

## 2017-03-20 NOTE — PROGRESS NOTES
[facility]    Hospitalist Progress Note    Date of Service (when I saw the patient): 03/20/2017    Assessment & Plan   Bhaskar Hope is an 89-year-old male with past medical history of hip osteoarthritis, severe aortic stenosis and iron deficiency anemia who presented to the Emergency Department following recheck performed by a [facility]  and finding the house in disarray and concern over patient's home safety and inability to care for himself at home and he is admitted under observation for evaluation and possible placement.      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 He was found to have a house in disarray with minimal oral intake and has unknowingly been reliant on his neighbors for food and care over the past 1-2 years. He reports that he is continuing to drive and that he goes to all of his appointments and does all of his grocery shopping; however, the reality is that his neighbors have disconnected his car battery in 06/2016 and he has not seen a primary care provider in at least 1 year and does not keep up on any of his grocery shopping. It appears that he has limited mobility secondary to his right hip osteoarthritis and there is some concern about his inability to ambulate and care for herself at home.   - No formal diagnosis of memory problems or cognitive impairment, but during interview he displays problem with short memory   - Due to patient's ongoing memory issues and persistent desire to return home with the belief that he is managing extremely well, Psychiatry was asked to evaluate for competency, and agree exam is consistent with dementia   - PT recommending home with 24 hour supervision or TCU.   - SW also following for safe discharge planning, he will definitely need placement or 24 hrs supervision  - Treat B12 deficiency (see below)  - TSH 5.91, likely normal for age, check T4. F/u in 3 months.   - CT  scan of the head as outpatient. No focal deficits.     B12 Deficiency   - Start 1000 mcg IM daily for 1 week, then weekly for 4 weeks, then re-assess level and consider change to PO.   - Normal hgb @ admission (13.3>12.1) and normal MCV, mildly low WBC 3.2>4.5 and Plts 133.   - Folate normal.      Severe aortic stenosis.   He denies any dizziness or lightheadedness, albeit he has also not been ambulating very much.  - will monitor at this time     History of iron deficiency anemia.   hgb 13.3 at admission, nl MCV. >      Hx of HTN: has not been on meds for this since 2015.   - would not be aggressive in lowering BP in light of his severe AS  - BP controlled.      DVT Prophylaxis: ambulatio     Disposition: pending safe placement plan.     Raquel Long  [phone] (p)  Text Page (7AM - 6PM)     Interval History   Patient continues to adamantly deny that he has any problem caring for himself. Hopeful discharge to [facility] tomorrow. Coordinating with son.     -Data reviewed today: I reviewed all new labs and imaging results over the last 24 hours. I personally reviewed no images or EKG's today.    Physical Exam   Temp: 97.3  F (36.3  C) Temp src: Oral BP: 123/76 Pulse: 78   Resp: 16 SpO2: 98 % O2 Device: None (Room air)    Vitals:    03/16/17 1201 03/16/17 1900   Weight: 93 kg (205 lb) 83.5 kg (184 lb)     Vital Signs with Ranges  Temp:  [97.3  F (36.3  C)-98.6  F (37  C)] 97.3  F (36.3  C)  Pulse:  [71-80] 78  Resp:  [16-18] 16  BP: (103-123)/(68-76) 123/76  SpO2:  [97 %-98 %] 98 %  I/O last 3 completed shifts:  In: 600 [P.O.:600]  Out: 400 [Urine:400]    Constitutional:  NAD, Sitting up in bed. Very interactive and talkative.   Neuropsyche: alert, oriented to being in hospital, but not date or recent event, very short term memory.   Resp breathing comfortably, no edema.       Medications   All medications reviewed on 3/18   No scheduled medications currently.           Data     Recent Labs  Lab  03/20/17  0840 03/16/17  1240   WBC 4.5 3.2*   HGB 12.1* 13.3   MCV 91 90   * 126*   NA  --  140   POTASSIUM  --  3.8   CHLORIDE  --  108   CO2  --  23   BUN  --  16   CR  --  1.01   ANIONGAP  --  9   VINH  --  8.7   GLC  --  112*   ALBUMIN  --  3.4   PROTTOTAL  --  7.2   BILITOTAL  --  1.2   ALKPHOS  --  110   ALT  --  11   AST  --  15       No results found for this or any previous visit (from the past 24 hour(s)).

## 2017-03-20 NOTE — PLAN OF CARE
Problem: Goal Outcome Summary  Goal: Goal Outcome Summary  Outcome: No Change  vital signs normal or at patient baseline:met  -safe disposition plan has been identified :not met, SW following   -psych eval:met     Pleasantly confused. Demanding discharge home this afternoon and doesn't understand when explained we are waiting for safe disposition, has settled down since. Declined ambulation on nursing unit as he is enjoying watching basket ball games. Up to bathroom with standby assist and walker.

## 2017-03-21 ENCOUNTER — APPOINTMENT (OUTPATIENT)
Dept: CT IMAGING | Facility: CLINIC | Age: 82
End: 2017-03-21
Attending: INTERNAL MEDICINE
Payer: MEDICARE

## 2017-03-21 VITALS
WEIGHT: 184 LBS | BODY MASS INDEX: 23.61 KG/M2 | RESPIRATION RATE: 20 BRPM | SYSTOLIC BLOOD PRESSURE: 107 MMHG | DIASTOLIC BLOOD PRESSURE: 58 MMHG | TEMPERATURE: 98.1 F | HEART RATE: 70 BPM | OXYGEN SATURATION: 97 % | HEIGHT: 74 IN

## 2017-03-21 PROCEDURE — 25000128 H RX IP 250 OP 636: Performed by: INTERNAL MEDICINE

## 2017-03-21 PROCEDURE — 99207 ZZC CDG-CODE CATEGORY CHANGED: CPT | Performed by: INTERNAL MEDICINE

## 2017-03-21 PROCEDURE — 96372 THER/PROPH/DIAG INJ SC/IM: CPT

## 2017-03-21 PROCEDURE — 40000894 ZZH STATISTIC OT IP EVAL DEFER

## 2017-03-21 PROCEDURE — 99217 ZZC OBSERVATION CARE DISCHARGE: CPT | Performed by: INTERNAL MEDICINE

## 2017-03-21 PROCEDURE — G0378 HOSPITAL OBSERVATION PER HR: HCPCS

## 2017-03-21 PROCEDURE — 70450 CT HEAD/BRAIN W/O DYE: CPT

## 2017-03-21 RX ORDER — CYANOCOBALAMIN 1000 UG/ML
1000 INJECTION, SOLUTION INTRAMUSCULAR; SUBCUTANEOUS DAILY
Qty: 6 ML | Refills: 0 | Status: SHIPPED | OUTPATIENT
Start: 2017-03-21 | End: 2017-03-27

## 2017-03-21 RX ADMIN — CYANOCOBALAMIN 1000 MCG: 1000 INJECTION, SOLUTION INTRAMUSCULAR at 08:37

## 2017-03-21 NOTE — PROGRESS NOTES
"SW:  OT SLUM score confirms significant short term memory deficit with recommendation for TCU or 24 hour supervision.  Patient's neighbor Mario was able to locate patient's check book at home and bring it over to the hospital.  Writer met with patient and Mario and Mario explained to patient that he thought it would be a good idea if patient went to [facility].    Writer reviewed again the plan of going to [facility] for ongoing therapy assessment and recommendation for long term care.  Reviewed the deposit needed of $3,000 and patient accepted the arrangement.  Patient did say \"I guess I don't have any choice\".  Writer had earlier reviewed with patient that if OT concurred with earlier testing that the Dr would not discharge him home and if patient did not voluntarily agree with the recommendation, the MD would possible seek legal involvement.    Writer contacted son Anish and explained above, including the cost for care and that patient will be signing the check.  Son asked if writer would talk with patient about POA for HC and Finances so guardianship would not need to be pursued.  In speaking with patient he believes Sincere Painting has POA of Finances and in relation to HC he would want to appoint \"the same person\".  Updated son and he will follow up with Mr Painting.    Son also given Ladonna Townsend name and number for resource.      PAS-RR    D: Per DHS regulation, SW completed and submitted PAS-RR to [place] [employer] Direct Connect via the Senior LinkAge Line.  PAS-RR confirmation # is : [id number]    I: SW spoke with son and they are aware a PAS-RR has been submitted.  SW reviewed with son that they may be contacted for a follow up appointment within 10 days of hospital discharge if their SNF stay is < 30 days.  Contact information for [facility] LinkAge Line was also provided.    A: Son verbalized understanding.    P: Further questions may be directed to [facility] LinkAge Line at #[phone], option #4 for PAS-RR " staff.

## 2017-03-21 NOTE — PLAN OF CARE
Problem: Goal Outcome Summary  Goal: Goal Outcome Summary  OT: New order received for OT consult to address cognitive impairment. Per chart review, pt is admitted under observation with discharge recommendations of TCU and/or 24 hr supervision. Psych completed MMSE and pt received 11/30. Today, pt completed cognitive screen, SLUMS, scoring 17/30, which indicated a severe cognitive impairment with STM deficits. Pt would benefit from TCU stay; however, pt adamant about returning home. If discharges home, recommend 24 hr supervision for increased A with all IADLs, home RN for med mgmt, and home OT for ongoing intervention. OT signing off at this time and deferring further OT intervention to next level of care.

## 2017-03-21 NOTE — PLAN OF CARE
Problem: Goal Outcome Summary  Goal: Goal Outcome Summary  Outcome: No Change  A/O x2-3, unsure of date and situation. Forgetful and impulsive at times. Up w/1 and walker. Denies pain. Regular diet. B12 injection given today. D/C pending placement.

## 2017-03-21 NOTE — PLAN OF CARE
Problem: Goal Outcome Summary  Goal: Goal Outcome Summary  Outcome: No Change  Nursing note  Pt a/ ox 2. Up with sba in the room. Pt denies any pain. On RA. Will continue to monitor.

## 2017-03-21 NOTE — DISCHARGE SUMMARY
[facility]    Discharge Summary  Hospitalist    Date of Admission:  3/16/2017  Date of Discharge:  3/21/2017  Discharging Provider: Raquel Long  Date of Service (when I saw the patient): 03/21/17    History of Present Illness   Bhaskar Hope is an 89-year-old male with past medical history of hip osteoarthritis, severe aortic stenosis and iron deficiency anemia who presented to the Emergency Department following recheck performed by a [facility]  and finding the house in disarray and concern over patient's home safety and inability to care for himself at home and he is admitted under observation for evaluation and possible placement.     Hospital Course      Organized by Discharge Diagnosis  Bhaskar Hope was admitted on 3/16/2017.  The following problems were addressed during his hospitalization:    Probable Alzheimer's Dementia - Inability to care for self/ vulnerable adult   - See H & P for detail. He was found to have a house in disarray with minimal oral intake and has unknowingly been reliant on his neighbors for food and care over the past 1-2 years. He reports that he is continuing to drive and that he goes to all of his appointments and does all of his grocery shopping; however, the reality is that his neighbors have disconnected his car battery in 06/2016 and he has not seen a primary care provider in at least 1 year and does not keep up on any of his grocery shopping. It appears that he has limited mobility secondary to his right hip osteoarthritis and there is some concern about his inability to ambulate and care for herself at home.   - No formal diagnosis of memory problems or cognitive impairment, but during interview he displays very poor short term memory and per family and friends this has been going on for over a year.   - Psychiatry was asked to evaluate for competency, and agree exam is consistent with dementia, and needs safe discharge plan even if he does not  agree.   - OT evaluated and SLUM score confirmed significant short term memory deficit and recommended 24 hour care. PT recommending home with 24 hour supervision or TCU.   - Son, Anish Hope, involved in care and agreed with plan.     Dementia Evaluation  - Treat B12 deficiency (see below)  - TSH 5.91, likely normal for age, check T4. F/u in 3 months.   - CT scan of the head unremarkable.      B12 Deficiency   - Start 1000 mcg IM daily for 1 week, then weekly for 4 weeks, then re-assess level and consider change to PO.   - Normal hgb @ admission (13.3>12.1) and normal MCV, mildly low WBC 3.2>4.5 and Plts 133.   - Folate normal.       Severe aortic stenosis.   - He denied any chest pain, lightheadedness, or SOB, and had no evidence of CHF during his stay.        Hx of HTN: has not been on meds for this since 2015.   - would not be aggressive in lowering BP in light of his severe AS  - BP controlled.     Raquel Long    Significant Results and Procedures   No procedures.   Imaging as outlined below.       Pending Results   None   Unresulted Labs Ordered in the Past 30 Days of this Admission     No orders found from 1/15/2017 to 3/17/2017.          Code Status   Full Code       Primary Care Physician   Johnny Tafoya MD    Physical Exam   Temp: 98.1  F (36.7  C) Temp src: Oral BP: 107/58 Pulse: 70   Resp: 20 SpO2: 97 % O2 Device: None (Room air)    Vitals:    03/16/17 1201 03/16/17 1900   Weight: 93 kg (205 lb) 83.5 kg (184 lb)     Vital Signs with Ranges  Temp:  [98.1  F (36.7  C)] 98.1  F (36.7  C)  Pulse:  [70] 70  Resp:  [20] 20  BP: (107)/(58) 107/58  SpO2:  [97 %] 97 %  I/O last 3 completed shifts:  In: -   Out: 770 [Urine:770]    Constitutional:  NAD,   Neuropsyche: alert, oriented to being in hospital, but which one, doesn't know day of the week or month, answers questions appropriately. Face symmetric, CN intact, Speech Normal. Good strength in upper and lower extremities, but reduced range of motion of  right hip flexor due to pain. Patient states he's had a bad right hip. FNF intact.   Respiratory: Breathing comfortably, good air exchange, no wheezes, no crackles.   Cardiovascular: Regular rate and rhythm with systolic murmur. no edema.  GI: soft, NT/ND, BS normal  Skin/Integumen: No acute rash or sign of bleeding.       Discharge Disposition   Discharged to short-term care facility  Condition at discharge: Good    Consultations This Hospital Stay   SOCIAL WORK IP CONSULT  CARE COORDINATOR IP CONSULT  PHYSICAL THERAPY ADULT IP CONSULT  PSYCHIATRY IP CONSULT  OCCUPATIONAL THERAPY ADULT IP CONSULT  OCCUPATIONAL THERAPY ADULT IP CONSULT  PHYSICAL THERAPY ADULT IP CONSULT  OCCUPATIONAL THERAPY ADULT IP CONSULT    Time Spent on this Encounter   I, Raquel Long, personally saw the patient today and spent less than or equal to 30 minutes discharging this patient.    Discharge Orders     General info for SNF   Length of Stay Estimate: Long Term Care  Condition at Discharge: Stable  Level of care:skilled   Rehabilitation Potential N/A  Admission H&P remains valid and up-to-date: Yes  Recent Chemotherapy: N/A  Use Nursing Home Standing Orders: Yes     Mantoux instructions   Give two-step Mantoux (PPD) Per Facility Policy Yes     Activity - Up with assistive device     Follow Up (Transitional Care Management)   Follow up with primary care provider, Johnny Tafoya MD, within 7-14 days to evaluate medication change and for hospital follow- up.  The following labs/tests are recommended: B12 level in 1 month, TSH in 3 months.      Appointments on [place] and/or [facility] (with [facility] or [facility] provider or service). Call [phone] if you haven't heard regarding these appointments within 7 days of discharge.     Physical Therapy Adult Consult   Evaluate and treat as clinically indicated.  Reason:  Assess appropriate level of care     Occupational Therapy Adult Consult   Evaluate and treat as clinically  indicated.    Reason:  Late-onset dementia, assess appropriate level of care.     Advance Diet as Tolerated   Regular Diet Adult       Discharge Medications   Discharge Medication List as of 3/21/2017  4:20 PM      START taking these medications    Details   cyanocobalamin (VITAMIN B12) 1000 MCG/ML injection Inject 1 mL (1,000 mcg) into the muscle daily for 6 days Then once weekly for 4 weeks, then once monthly., Disp-6 mL, R-0, E-Prescribe           Allergies   No Known Allergies  Data     IMAGING RESULTS FROM THIS HOSPITAL STAY:  Results for orders placed or performed during the hospital encounter of 03/16/17   CT Head w/o Contrast    Narrative    CT SCAN OF THE HEAD WITHOUT CONTRAST   3/21/2017 3:48 PM     HISTORY: Dementia.    TECHNIQUE: Axial images of the head and coronal reformations without  IV contrast material. Radiation dose for this scan was reduced using  automated exposure control, adjustment of the mA and/or kV according  to patient size, or iterative reconstruction technique.    COMPARISON: None.    FINDINGS: Moderate cerebral atrophy is present. There are some minimal  patchy white matter changes in both hemispheres without mass effect.  There is no evidence for intracranial hemorrhage, mass effect, acute  infarct, or a skull fracture. Vascular calcifications are noted at the  skull base.      Impression    IMPRESSION: Chronic changes. No evidence for intracranial hemorrhage  or any acute process.    CLARENCE WORTHY MD       MOST RECENT LAB RESULTS:  Most Recent 3 CBC's:  Recent Labs   Lab Test  03/20/17   0840  03/16/17   1240   WBC  4.5  3.2*   HGB  12.1*  13.3   MCV  91  90   PLT  133*  126*      Most Recent 3 BMP's:  Recent Labs   Lab Test  03/16/17   1240 05/05/15 01/13/15   NA  140   --    --    POTASSIUM  3.8  4.2  3.5   CHLORIDE  108   --    --    CO2  23   --    --    BUN  16   --    --    CR  1.01  1.08  1.26*   ANIONGAP  9   --    --    VINH  8.7   --    --    GLC  112*  99  96     Most  Recent 2 LFT's:  Recent Labs   Lab Test  03/16/17   1240 05/05/15   AST  15  18   ALT  11  11   ALKPHOS  110   --    BILITOTAL  1.2   --          Most Recent TSH, T4 and HgbA1c: Recent Labs   Lab Test  03/20/17   0840 01/13/15   TSH  5.91*  3.33   T4  0.93   --    A1C   --   5.6

## 2017-03-21 NOTE — PROGRESS NOTES
SW:  D:  Met with patient today and also spoke with two of patient's friends. Doug Griffin [phone], [phone].  He reports patient has poor short term memory.  He states patient will tell me he still drives but he knows he has not driven for over a year. Mario also reports patient hasn't driven for over a year.  Mario also reports that a friend Pedro Painting buys patient's groceries and is .  Writer tried calling Mr Painting, [phone], [phone] but his work voice mail indicates he is out of town till March 27.      Patient is very pleasant and strong in his conviction that he can live independently.    He reports he eats three meals a day and maintains his weight.  He insists that he still drives to [facility] to purchase his groceries. Writer explained his friends report he hasn't driven for over a year.  Explained the adult protection worker reported feces in his house.  Patient declines this and over and over tells writer how he is such a good  and offers writer to come to his house to see.    Writer recommended he transfer to [facility] for further assessment to determine if professionals feel he can return home and if not suggested he would be encouraged to move into [facility] or move to an assisted living.  Explained the cost for the TCU will be approximately $300.00 a day and he will be required to make a down payment of $3,000.  Patient is familiar with [facility] as his father lived there and patients home is only a few blocks away.   Patient continues to believe he can return home and doesn't see the need for admission to a transitional care center.  Patient appears to be confabulating about his driving.  Writer did tell patient that MD will not d/c him home and that if he doesn't voluntarily agree to the plan, the doctor may ask the courts to decide if he can go home.  Writer also explained he had been given an exam by the psychiatrist  indicating he cannot return home.  Again patient doesn't remember this or agree with the findings.  Given this, writer has confired with care coordinator and we are asking OT to complete SlUMS testwith patient today.  If they concur with Dr Kamara's assessment then writer feels patient will cooperate.  If he doesn't cooperate then we will likely need to consider petition for commitment.  OT will see patient around 1400

## 2017-03-21 NOTE — PROGRESS NOTES
[facility] wheelchair transport here to pickup patient and take to [facility]. IV access removed. Pt dressed with shoes, clothing, and jacket from home.

## 2017-03-21 NOTE — PLAN OF CARE
Problem: Goal Outcome Summary  Goal: Goal Outcome Summary  Outcome: No Change  A&O X 2, disoriented to time and situation, forgetful, denies pain, VSS on RA, ambulate in hallway x 2, possible discharge today.

## 2017-03-22 NOTE — PROGRESS NOTES
"SHERRY ALANIS:  Spoke with Barrett Almanza [employer] investigator, Jose [phone].  Updated her regarding final d/c plan.  Explained writer received a call today from [employer] who patient said is his \"\".  Mr Painting is out of town and will be back by Monday.  His cell phone is [phone].  Reviewed with Mr Painting, events of d/c plan and check which patient will writer to [facility] for $3,000.  Mr Painting said patient has the funds in his checking account to cover the check.  Jose reports there is a financial expoilation complaint filed again Mr Painting however at this time Jose is investigating. Att this time, Jose is waiting to review patient's bank statements from [facility].  Writer asked her if I should alert SHERRY at [facility].    Jose stated she will communicate with SHERRY Arreola at [facility], if the investigation is turned over to the police.    "

## 2017-03-29 ENCOUNTER — RECORDS - HEALTHEAST (OUTPATIENT)
Dept: LAB | Facility: CLINIC | Age: 82
End: 2017-03-29

## 2017-03-30 LAB — HBA1C MFR BLD: 5.4 % (ref 4.2–6.1)

## 2017-04-20 ENCOUNTER — TRANSFERRED RECORDS (OUTPATIENT)
Dept: HEALTH INFORMATION MANAGEMENT | Facility: CLINIC | Age: 82
End: 2017-04-20

## 2017-04-28 ENCOUNTER — TRANSFERRED RECORDS (OUTPATIENT)
Dept: HEALTH INFORMATION MANAGEMENT | Facility: CLINIC | Age: 82
End: 2017-04-28

## 2017-05-08 ENCOUNTER — APPOINTMENT (OUTPATIENT)
Dept: CT IMAGING | Facility: CLINIC | Age: 82
End: 2017-05-08
Attending: EMERGENCY MEDICINE
Payer: MEDICARE

## 2017-05-08 ENCOUNTER — HOSPITAL ENCOUNTER (OUTPATIENT)
Facility: CLINIC | Age: 82
End: 2017-05-09
Attending: EMERGENCY MEDICINE | Admitting: INTERNAL MEDICINE
Payer: MEDICARE

## 2017-05-08 DIAGNOSIS — K72.00 ACUTE LIVER FAILURE WITHOUT HEPATIC COMA: ICD-10-CM

## 2017-05-08 DIAGNOSIS — R04.2 HEMOPTYSIS: ICD-10-CM

## 2017-05-08 DIAGNOSIS — N17.9 ACUTE RENAL FAILURE, UNSPECIFIED ACUTE RENAL FAILURE TYPE (H): ICD-10-CM

## 2017-05-08 DIAGNOSIS — J18.9 PNEUMONIA OF RIGHT MIDDLE LOBE DUE TO INFECTIOUS ORGANISM: ICD-10-CM

## 2017-05-08 LAB
ANION GAP SERPL CALCULATED.3IONS-SCNC: 16 MMOL/L (ref 3–14)
BASOPHILS # BLD AUTO: 0 10E9/L (ref 0–0.2)
BASOPHILS NFR BLD AUTO: 0.1 %
BUN SERPL-MCNC: 80 MG/DL (ref 7–30)
CALCIUM SERPL-MCNC: 8.2 MG/DL (ref 8.5–10.1)
CHLORIDE SERPL-SCNC: 101 MMOL/L (ref 94–109)
CO2 SERPL-SCNC: 15 MMOL/L (ref 20–32)
CREAT SERPL-MCNC: 3.12 MG/DL (ref 0.66–1.25)
D DIMER PPP FEU-MCNC: ABNORMAL UG/ML FEU (ref 0–0.5)
DIFFERENTIAL METHOD BLD: ABNORMAL
EOSINOPHIL # BLD AUTO: 0 10E9/L (ref 0–0.7)
EOSINOPHIL NFR BLD AUTO: 0 %
ERYTHROCYTE [DISTWIDTH] IN BLOOD BY AUTOMATED COUNT: 15 % (ref 10–15)
GFR SERPL CREATININE-BSD FRML MDRD: 19 ML/MIN/1.7M2
GLUCOSE SERPL-MCNC: 160 MG/DL (ref 70–99)
HCT VFR BLD AUTO: 35.2 % (ref 40–53)
HGB BLD-MCNC: 11.5 G/DL (ref 13.3–17.7)
IMM GRANULOCYTES # BLD: 0.1 10E9/L (ref 0–0.4)
IMM GRANULOCYTES NFR BLD: 0.8 %
INTERPRETATION ECG - MUSE: NORMAL
LACTATE SERPL-SCNC: 6.7 MMOL/L (ref 0.4–2)
LYMPHOCYTES # BLD AUTO: 0.9 10E9/L (ref 0.8–5.3)
LYMPHOCYTES NFR BLD AUTO: 8.7 %
MCH RBC QN AUTO: 28.3 PG (ref 26.5–33)
MCHC RBC AUTO-ENTMCNC: 32.7 G/DL (ref 31.5–36.5)
MCV RBC AUTO: 87 FL (ref 78–100)
MONOCYTES # BLD AUTO: 1.1 10E9/L (ref 0–1.3)
MONOCYTES NFR BLD AUTO: 10.8 %
NEUTROPHILS # BLD AUTO: 8 10E9/L (ref 1.6–8.3)
NEUTROPHILS NFR BLD AUTO: 79.6 %
NRBC # BLD AUTO: 0 10*3/UL
NRBC BLD AUTO-RTO: 0 /100
NT-PROBNP SERPL-MCNC: ABNORMAL PG/ML (ref 0–1800)
PLATELET # BLD AUTO: 131 10E9/L (ref 150–450)
POTASSIUM SERPL-SCNC: 5.7 MMOL/L (ref 3.4–5.3)
RBC # BLD AUTO: 4.07 10E12/L (ref 4.4–5.9)
SODIUM SERPL-SCNC: 132 MMOL/L (ref 133–144)
WBC # BLD AUTO: 10 10E9/L (ref 4–11)

## 2017-05-08 PROCEDURE — 96361 HYDRATE IV INFUSION ADD-ON: CPT

## 2017-05-08 PROCEDURE — 87040 BLOOD CULTURE FOR BACTERIA: CPT | Performed by: EMERGENCY MEDICINE

## 2017-05-08 PROCEDURE — 93005 ELECTROCARDIOGRAM TRACING: CPT

## 2017-05-08 PROCEDURE — 80076 HEPATIC FUNCTION PANEL: CPT | Performed by: EMERGENCY MEDICINE

## 2017-05-08 PROCEDURE — 51702 INSERT TEMP BLADDER CATH: CPT

## 2017-05-08 PROCEDURE — 71250 CT THORAX DX C-: CPT

## 2017-05-08 PROCEDURE — 36415 COLL VENOUS BLD VENIPUNCTURE: CPT

## 2017-05-08 PROCEDURE — 85379 FIBRIN DEGRADATION QUANT: CPT | Performed by: EMERGENCY MEDICINE

## 2017-05-08 PROCEDURE — 85610 PROTHROMBIN TIME: CPT | Performed by: EMERGENCY MEDICINE

## 2017-05-08 PROCEDURE — 84484 ASSAY OF TROPONIN QUANT: CPT | Performed by: EMERGENCY MEDICINE

## 2017-05-08 PROCEDURE — 85025 COMPLETE CBC W/AUTO DIFF WBC: CPT | Performed by: EMERGENCY MEDICINE

## 2017-05-08 PROCEDURE — 25000128 H RX IP 250 OP 636: Performed by: EMERGENCY MEDICINE

## 2017-05-08 PROCEDURE — 99285 EMERGENCY DEPT VISIT HI MDM: CPT | Mod: 25

## 2017-05-08 PROCEDURE — 83880 ASSAY OF NATRIURETIC PEPTIDE: CPT | Performed by: EMERGENCY MEDICINE

## 2017-05-08 PROCEDURE — 83605 ASSAY OF LACTIC ACID: CPT | Performed by: EMERGENCY MEDICINE

## 2017-05-08 PROCEDURE — 80048 BASIC METABOLIC PNL TOTAL CA: CPT | Performed by: EMERGENCY MEDICINE

## 2017-05-08 RX ORDER — SODIUM CHLORIDE 9 MG/ML
1000 INJECTION, SOLUTION INTRAVENOUS CONTINUOUS
Status: DISCONTINUED | OUTPATIENT
Start: 2017-05-08 | End: 2017-05-09

## 2017-05-08 RX ADMIN — SODIUM CHLORIDE 1000 ML: 9 INJECTION, SOLUTION INTRAVENOUS at 23:18

## 2017-05-09 VITALS
DIASTOLIC BLOOD PRESSURE: 55 MMHG | OXYGEN SATURATION: 97 % | SYSTOLIC BLOOD PRESSURE: 83 MMHG | RESPIRATION RATE: 16 BRPM | HEIGHT: 76 IN | TEMPERATURE: 97.5 F | HEART RATE: 91 BPM

## 2017-05-09 LAB
ALBUMIN SERPL-MCNC: 2.6 G/DL (ref 3.4–5)
ALBUMIN UR-MCNC: 30 MG/DL
ALP SERPL-CCNC: 267 U/L (ref 40–150)
ALT SERPL W P-5'-P-CCNC: 2292 U/L (ref 0–70)
APPEARANCE UR: ABNORMAL
AST SERPL W P-5'-P-CCNC: 4481 U/L (ref 0–45)
BILIRUB DIRECT SERPL-MCNC: 0.7 MG/DL (ref 0–0.2)
BILIRUB SERPL-MCNC: 1.5 MG/DL (ref 0.2–1.3)
BILIRUB UR QL STRIP: ABNORMAL
COLOR UR AUTO: ABNORMAL
GLUCOSE UR STRIP-MCNC: NEGATIVE MG/DL
HGB UR QL STRIP: NEGATIVE
HYALINE CASTS #/AREA URNS LPF: 62 /LPF (ref 0–2)
INR PPP: 2.35 (ref 0.86–1.14)
KETONES UR STRIP-MCNC: NEGATIVE MG/DL
LACTATE SERPL-SCNC: 6.8 MMOL/L (ref 0.4–2)
LEUKOCYTE ESTERASE UR QL STRIP: NEGATIVE
MUCOUS THREADS #/AREA URNS LPF: PRESENT /LPF
NITRATE UR QL: NEGATIVE
PH UR STRIP: 5 PH (ref 5–7)
PROT SERPL-MCNC: 6.1 G/DL (ref 6.8–8.8)
RBC #/AREA URNS AUTO: 1 /HPF (ref 0–2)
SP GR UR STRIP: 1.02 (ref 1–1.03)
TROPONIN I SERPL-MCNC: 14.63 UG/L (ref 0–0.04)
URN SPEC COLLECT METH UR: ABNORMAL
UROBILINOGEN UR STRIP-MCNC: 2 MG/DL (ref 0–2)
WBC #/AREA URNS AUTO: 3 /HPF (ref 0–2)

## 2017-05-09 PROCEDURE — 83605 ASSAY OF LACTIC ACID: CPT | Performed by: EMERGENCY MEDICINE

## 2017-05-09 PROCEDURE — 96368 THER/DIAG CONCURRENT INF: CPT

## 2017-05-09 PROCEDURE — 87040 BLOOD CULTURE FOR BACTERIA: CPT | Performed by: EMERGENCY MEDICINE

## 2017-05-09 PROCEDURE — 81001 URINALYSIS AUTO W/SCOPE: CPT | Performed by: EMERGENCY MEDICINE

## 2017-05-09 PROCEDURE — 96366 THER/PROPH/DIAG IV INF ADDON: CPT

## 2017-05-09 PROCEDURE — 99223 1ST HOSP IP/OBS HIGH 75: CPT | Mod: AI | Performed by: INTERNAL MEDICINE

## 2017-05-09 PROCEDURE — 96375 TX/PRO/DX INJ NEW DRUG ADDON: CPT

## 2017-05-09 PROCEDURE — 25000128 H RX IP 250 OP 636: Performed by: EMERGENCY MEDICINE

## 2017-05-09 PROCEDURE — 96365 THER/PROPH/DIAG IV INF INIT: CPT | Mod: 59

## 2017-05-09 PROCEDURE — 25000125 ZZHC RX 250: Performed by: EMERGENCY MEDICINE

## 2017-05-09 RX ORDER — FENTANYL CITRATE 50 UG/ML
25 INJECTION, SOLUTION INTRAMUSCULAR; INTRAVENOUS ONCE
Status: COMPLETED | OUTPATIENT
Start: 2017-05-09 | End: 2017-05-09

## 2017-05-09 RX ORDER — LORAZEPAM 2 MG/ML
.5-1 INJECTION INTRAMUSCULAR
Status: DISCONTINUED | OUTPATIENT
Start: 2017-05-09 | End: 2017-05-09 | Stop reason: HOSPADM

## 2017-05-09 RX ORDER — HYDROMORPHONE HYDROCHLORIDE 1 MG/ML
.3-.5 INJECTION, SOLUTION INTRAMUSCULAR; INTRAVENOUS; SUBCUTANEOUS
Status: DISCONTINUED | OUTPATIENT
Start: 2017-05-09 | End: 2017-05-09 | Stop reason: HOSPADM

## 2017-05-09 RX ORDER — BISACODYL 10 MG
10 SUPPOSITORY, RECTAL RECTAL
Status: DISCONTINUED | OUTPATIENT
Start: 2017-05-12 | End: 2017-05-09 | Stop reason: HOSPADM

## 2017-05-09 RX ORDER — ATROPINE SULFATE 10 MG/ML
1-2 SOLUTION/ DROPS OPHTHALMIC
Status: DISCONTINUED | OUTPATIENT
Start: 2017-05-09 | End: 2017-05-09 | Stop reason: HOSPADM

## 2017-05-09 RX ORDER — ONDANSETRON 4 MG/1
4 TABLET, ORALLY DISINTEGRATING ORAL EVERY 6 HOURS PRN
Status: DISCONTINUED | OUTPATIENT
Start: 2017-05-09 | End: 2017-05-09 | Stop reason: HOSPADM

## 2017-05-09 RX ORDER — AMOXICILLIN 250 MG
1 CAPSULE ORAL 2 TIMES DAILY
Status: DISCONTINUED | OUTPATIENT
Start: 2017-05-09 | End: 2017-05-09 | Stop reason: HOSPADM

## 2017-05-09 RX ORDER — SALIVA STIMULANT COMB. NO.3
2 SPRAY, NON-AEROSOL (ML) MUCOUS MEMBRANE
Status: DISCONTINUED | OUTPATIENT
Start: 2017-05-09 | End: 2017-05-09 | Stop reason: HOSPADM

## 2017-05-09 RX ORDER — LORAZEPAM 0.5 MG/1
.5-1 TABLET ORAL
Status: DISCONTINUED | OUTPATIENT
Start: 2017-05-09 | End: 2017-05-09 | Stop reason: HOSPADM

## 2017-05-09 RX ORDER — PROCHLORPERAZINE MALEATE 5 MG
5 TABLET ORAL EVERY 6 HOURS PRN
Status: DISCONTINUED | OUTPATIENT
Start: 2017-05-09 | End: 2017-05-09 | Stop reason: HOSPADM

## 2017-05-09 RX ORDER — NALOXONE HYDROCHLORIDE 0.4 MG/ML
.1-.4 INJECTION, SOLUTION INTRAMUSCULAR; INTRAVENOUS; SUBCUTANEOUS
Status: DISCONTINUED | OUTPATIENT
Start: 2017-05-09 | End: 2017-05-09 | Stop reason: HOSPADM

## 2017-05-09 RX ORDER — PROCHLORPERAZINE 25 MG
12.5 SUPPOSITORY, RECTAL RECTAL EVERY 12 HOURS PRN
Status: DISCONTINUED | OUTPATIENT
Start: 2017-05-09 | End: 2017-05-09 | Stop reason: HOSPADM

## 2017-05-09 RX ORDER — HYDROMORPHONE HYDROCHLORIDE 1 MG/ML
1-2 SOLUTION ORAL
Status: DISCONTINUED | OUTPATIENT
Start: 2017-05-09 | End: 2017-05-09 | Stop reason: HOSPADM

## 2017-05-09 RX ORDER — ONDANSETRON 2 MG/ML
4 INJECTION INTRAMUSCULAR; INTRAVENOUS EVERY 6 HOURS PRN
Status: DISCONTINUED | OUTPATIENT
Start: 2017-05-09 | End: 2017-05-09 | Stop reason: HOSPADM

## 2017-05-09 RX ADMIN — FENTANYL CITRATE 25 MCG: 50 INJECTION, SOLUTION INTRAMUSCULAR; INTRAVENOUS at 01:44

## 2017-05-09 RX ADMIN — VANCOMYCIN HYDROCHLORIDE 1750 MG: 1 INJECTION, POWDER, LYOPHILIZED, FOR SOLUTION INTRAVENOUS at 00:39

## 2017-05-09 RX ADMIN — SODIUM CHLORIDE 1000 ML: 9 INJECTION, SOLUTION INTRAVENOUS at 00:54

## 2017-05-09 RX ADMIN — CEFEPIME HYDROCHLORIDE 2 G: 2 INJECTION, POWDER, FOR SOLUTION INTRAVENOUS at 00:40

## 2017-05-09 NOTE — ED NOTES
Patient repositioned to [facility].  Reports feeling better with position changed.  O2 nasal cannula applied 2 Lt due to continued tachypnea.

## 2017-05-09 NOTE — ED NOTES
"Patient noted to frequent weak cough.  Non productive at this time.  Complaints of pain to right hip.  Asked about son Neo.  Patient is restless, not moving around in bed but just uncomfortable stating constant \"ouch, ouch.  Pain!\"   Has eyes squinted shut, makes frequent facial grimaces from pain complaints.  Moves legs around just readjusting, bending leg up then stretching back out.    "

## 2017-05-09 NOTE — ED NOTES
Bed: ED22  Expected date:   Expected time:   Means of arrival:   Comments:  Qamar 427 Cough blood 89 male

## 2017-05-09 NOTE — ED NOTES
DATE:  5/9/2017   TIME OF RECEIPT FROM LAB:  0211  LAB TEST:  Lactic Acids  LAB VALUE:  6.8  RESULTS GIVEN WITH READ-BACK TO (PROVIDER): Dr. Pierce  TIME LAB VALUE REPORTED TO PROVIDER:   0212

## 2017-05-09 NOTE — ED PROVIDER NOTES
"  History     Chief Complaint:  Hemoptysis     HPI   Bhaskar Hope is a 89 year old male former smoker, with history of dementia, iron-deficiency anemia, severe aortic stenosis, DM type 2, HTN, dyslipidemia, and hospitalization in March for failure to care for himself, who presents from nursing home for evaluation of hemoptysis.  Nursing home reported that at 3 PM noted cough with dark blood sputum. He had little intake today. BP 79/46 at the home. Per nursing home, patient has been having watery diarrhea x 2 weeks, and has tested negative for C. Difficile as of 2-3 days ago.  His nurse today reports that he did not eat anything for most of the day.   Patient reports right hip pain, though he states this is chronic.  He denies other symptoms, and states \"I feel fine.\"  He denies shortness of breath or chest pain.     Allergies:  No Known Allergies     Medications:    Vit B12      Past Medical History:    Severe aortic valve stenosis - sclerosis  DDD  HTN  Dyslipidemia  Peripheral neuropathy  DM type 2 with PAD  Vit B12 deficiency   Iron deficiency anemia    Past Surgical History:    History is non-contributory.     Family History:    History is non-contributory.     Social History:  Marital Status:   [4]  Tobacco Use: former  The patient presents via EMS.  The patient is retired, was an   The patient lives in an assisted living facility.  PCP: Johnny Tafoya    Review of Systems   Unable to perform ROS: Dementia       Physical Exam     Patient Vitals for the past 24 hrs:   BP Temp Pulse Heart Rate Resp SpO2   05/09/17 0220 (!) 83/55 - - 82 18 98 %   05/09/17 0130 (!) 81/49 - - 84 13 -   05/09/17 0100 (!) 80/56 - - 85 16 95 %   05/09/17 0050 94/58 - - 88 28 97 %   05/09/17 0044 (!) 87/58 - - 86 24 94 %   05/09/17 0040 113/60 - - 86 15 93 %   05/09/17 0035 93/46 - - 88 (!) 33 94 %   05/09/17 0030 (!) 88/58 - - 88 29 95 %   05/09/17 0021 94/56 - - 89 (!) 31 96 %   05/09/17 0019 90/61 - - - - - "   05/09/17 0015 - - - 87 (!) 33 96 %   05/09/17 0008 - - - 87 18 92 %   05/08/17 2345 (!) 85/67 - - 87 (!) 33 -   05/08/17 2330 (!) 73/64 - - 87 (!) 36 -   05/08/17 2315 (!) 89/64 - - 89 30 95 %   05/08/17 2300 (!) 76/60 - - 93 28 -   05/08/17 2245 (!) 89/63 - - 91 21 94 %   05/08/17 2230 (!) 87/65 - - 88 (!) 34 94 %   05/08/17 2225 (!) 83/62 97.5  F (36.4  C) 91 - 20 96 %       Physical Exam  Gen: Pleasant, appears stated age.    Eye:   Pupils are equal, round, and reactive.     Sclera non-injected.    ENT:   Dry mucus membranes.     Excoriations on tongue.    Oropharynx without lesions.    Cardiac:     Normal rate and regular rhythm.    Muffled sounds 1/6 systolic ejection murmur, without gallops, or rubs.    Pulmonary:     Rales right lung base.  Coarse breath sounds bilaterally.    No wheezes or rhonchi.    Abdomen:     Normal active bowel sounds. Coarse breath sounds throughout.   Abdomen is soft and distended, without focal tenderness. Rash right inguinal fold.    : Normal external male genitalia.    Musculoskeletal:     Normal movement of all extremities without evidence for deficit.   Pain with movement of the right hip, chronic per patient and son.    Extremities:    2+ pitting edema bilateral lower extremities. Thickened right big toenail.     Skin:   Warm and dry.    Neurologic:    Non-focal exam without asymmetric weakness or numbness.    Following commands, answering questions about smoking history.    Psychiatric:     Cooperative.      Emergency Department Course     ECG (23:14:47):  Indication: hemoptysis.   Rate 90 bpm. PA interval 206 ms. QRS duration 130 ms. QT/QTc 380/464 ms. R axis -47.   Interpretation: normal sinus rhythm. LAD. Nonspecific intraventricular block. Cannot rule out anteroseptal infarct, age undetermined. T wave abnormality, consider inferolateral ischemia. Abnormal ECG.  Agree with computer interpretation.   Changes noted above compared to prior ECG.   Interpreted at 2341 by  Kari, Akiko Hunt MD.     Imaging:  Radiology findings were communicated with the patient who voiced understanding of the findings.    CT Chest without Contrast, per radiology.    1. Patchy areas of consolidation and ground glass opacities in the mid and inferior right lung and less so in the left lung base. These are nonspecific, but most likely represent pneumonia. A follow-up chest  radiograph could be performed in a few weeks to confirm resolution.  2. Small to moderate sized right pleural effusion and small left pleural effusion.  3. A few nonspecific mildly enlarged mediastinal lymph nodes.    Laboratory:  Laboratory findings were communicated with the patient who voiced understanding of the findings.    CBC: WBC 10.0, HGB 11.5,   2322: Lactic Acid: 6.7  Blood cultures x2: Pending  0202: Lactic Acid: 6.8    BMP: Na 132, K 5.7, PCO2 15, AGap 16, , BUN 80, Creatinine 3.12, GFR 19, Ca 8.2  Hepatic Panel: BD: 0.7, Bilirubin total 1.5, Alb 2.5, Protein 6.1, AlkPhos 267, ALT 2,292, AST 4,481    BNP: 91,698  D-dimer: >20.0  2230: Troponin I: 14,627  INR: 2.35     UA: slightly cloudy, orange, Urinebilin small, Albumin 30, WBC 3, RBC 1, Hyaline casts 62     Interventions:  2318: NS 1,000 mL, IV   0039: Vancomycin 1,750 mg, IV   0040: Cefepime 2 g, IV  0054: NS 1,000 mL, IV   0144: Fentanyl 25 mcg, IV      Emergency Department Course:  Nursing notes and vitals reviewed.  I performed an exam of the patient as documented above.   The patient was placed on continuous pulse oximetry and cardiac monitoring.   A peripheral IV was established and blood was drawn for laboratory testing, results above.  Chest CT and bedside echocardiogram obtained while in the emergency department, findings above.      0021, discussed patient with his son over the phone, who is currently in [place] and is planning to come over to the ED.  0113, son Anish arrived to the ED. I had a lengthy conversation with both the patient and the  son about options of care and they both agreed to pursue comfort care at this time.  0150, discussed case with Dr. Carrillo, hospitalist service.    I rechecked the patient and the findings were explained to his son, who consents to admission. I discussed the patient with Dr. Carrillo, who will admit the patient for further monitoring, evaluation and treatment.      Impression & Plan      Medical Decision Makin year old male with a history of dementia, aortic stenosis and chronic right hip pain who presents today with hemoptysis. Upon arrival the patient was hypotensive, but he has been alert and conversant during his entire stay. He states that he feels well and his only complaint is right hip pain. He was not in any significant respiratory distress although did have a wet cough and rales on exam.   He has laboratory abnormalities overall consistent with multisystem organ failure. Upon discussion with the nurse at the nursing home, it seems like he has had several weeks of watery diarrhea. A component of this may be related to volume depletion. In addition it appears that the patient has pneumonia in the right middle lobe. This is likely the cause of his hemoptysis. Overall I fell his prognosis is very poor given the multisystemic organ failure, age and multiple comorbidities. Fortunately, the patient is still able to participate in the medical decision making. The son was able to be present for this as well. At this point, he feels that he would like to pursue comfort care. He states that he does not wish to suffer, even if it means prolonging his life. He is at peace with the idea of death. The patient's son is supportive but is tearful at the bedside. Overall I think they are both in agreement with the care plan going forward. The patient has remained hypotension but otherwise stable and mentating well. He will be admitted to the floor for comfort care measures.     Diagnosis:    ICD-10-CM   1. Hemoptysis R04.2    2. Acute renal failure, unspecified acute renal failure type (H) N17.9   3. Acute liver failure without hepatic coma K72.00     Disposition:   Inpatient - comfort care    Scribe Disclosure:  I, Soraya Lashell, am serving as a scribe at 10:42 PM on 5/8/2017 to document services personally performed by Akiko Pierce MD, based on my observations and the provider's statements to me.     EMERGENCY DEPARTMENT       Akiko Pierce MD  05/09/17 0557

## 2017-05-09 NOTE — H&P
PRIMARY CARE PROVIDER:  Johnny Tafoya MD      CHIEF COMPLAINT:  Hypotension.      HISTORY OF PRESENT ILLNESS:  Mr. Bhaskar Hope is an 89-year-old gentleman with history of Alzheimer's dementia, severe aortic stenosis, hypertension, diabetes mellitus type 2, and numerous other medical conditions.  Mr. Hope was hospitalized at [facility] from 03/16 through 03/21/2017 for failure to thrive.  He lived alone at that time.  [facility] , was making a home safety visit when Mr. Hope's house was noted to in disarray.  He was brought to [facility] ED on 03/16/17 because of concern for his safety and his inability to care for himself.  He was hospitalized for five days for failure to thrive and discharged to SNF where he has been residing since.  He has had chronic diarrhea for the past couple weeks with negative C. Diff.      Mr. Hope was found to be hypotensive with systolic blood pressure in the 70s at the nursing home today.  His baseline SBP is in the 90s.  He apparently had dark bloody sputum.  He was confused and disoriented.  911 was subsequently summoned, and patient was brought to [facility] ED for evaluation.      His initial vital signs when he arrived in the ED were temp of 97.5, BP 82/62, HR 91, RR 20, 96% sat on RA.   CT chest w/o contrast showed right lung pneumonia and small to moderate right-sided pleural effusion.  UA was grossly negative.  BMP showed K of 5.7, sodium of 132, and creatinine of 3.12.  Lactic acid level was 6.7.  He has markedly elevated liver function tests.  BNP is also significantly elevated at 91,698.  Troponin is elevated 14.6, with some ischemic changes noted on his EKG.  He has no leukocytosis.        He received IV cefepime, IV vancomycin, and IV fluids, and Levophed was ordered in the ED.  A Vazquez catheter was inserted.  The patient's son subsequently arrived.  Dr. Pepper and I discussed the patient's condition with the patient's son who agreed to convert patient's code status to comfort  care only.       PAST MEDICAL HISTORY:   1.  Alzheimer's dementia.   2.  Severe aortic stenosis.   3.  Diabetes mellitus, type 2.   4.  Hypertension.   5.  Hyperlipidemia.   6.  Iron deficiency anemia.   7.  Degenerative joint disease.   8.  Peripheral neuropathy.   9.  Gout.   10.  Cervical degenerative disc disease.      PAST SURGICAL HISTORY:   1.  Lumbar spine discectomy.   2.  Left knee arthroscopic surgery.      ALLERGIES:  Penicillin.      OUTPATIENT MEDICATIONS:  None.      SOCIAL HISTORY:  No tobacco, alcohol or IV drug use.      FAMILY HISTORY:  Reviewed and noncontributory to the patient's current admission.      REVIEW OF SYSTEMS:  Ten organ systems were checked and were all negative other than right hip pain.      PHYSICAL EXAM:   VITAL SIGNS:  Temperature 97.5, blood pressure 82/55, heart rate 82, respirations 18, 98% saturation on 2 liters per nasal cannula.   GENERAL:  Awake, follows commands appropriately, in moderate distress secondary to multiorgan failure.   HEAD:  Normocephalic, atraumatic.   EYES:  Pupils are equal, round, reactive to light.   NECK:  Supple.  No thyromegaly.   OROPHARYNX:  Clear.  Mucous membranes moist.   CARDIOVASCULAR:  Normal S1-S2.  3/6 systolic murmur.  No rubs or gallops.   LUNGS:  Clear to auscultation bilaterally anteriorly.   ABDOMEN:  Soft.  Good bowel sounds.  Nontender, no rebound or guarding.   EXTREMITIES:  No cyanosis or clubbing.   LYMPHATICS:  No edema.   NEUROLOGIC:  Nonfocal.   SKIN:  No rash.   MUSCULOSKELETAL:  No calf tenderness to palpation.   PSYCHIATRY:  Mood and affect are appropriate.      IMAGING STUDIES:   Personally reviewed by me.    CT chest without contrast.  Impression:    Patchy areas of consolidation and ground-glass opacities in the mid and inferior right lung, and less so in the left lung base.  These are nonspecific, but most likely represent pneumonia.  Small to moderate size right pleural effusion and small left pleural effusion.  A few  nonspecific mildly enlarged mediastinal lymph nodes.      EKG:  Normal sinus rhythm with ventricular rate of 90. ST depression in lateral leads, nonspecific intraventricular block, and left axis deviation.      LABS:   1.  Sodium 132, potassium 5.7, chloride 101, carbon dioxide 15, BUN 80, creatinine 3.12, glucose 160, calcium 8.2, albumin 2.6, total protein 6.1, total bilirubin 1.5, alkaline phosphatase 267, ALT 2291, AST 4481.   2.  Lactic acid 6.7.  Troponin 14.627.  BNP 91,698.   3.  WBC 10.0, hemoglobin 11.5, platelet count 131,000.   4.  INR is 2.35.   5.  D-dimer is elevated at greater than 20.   6.  Urinalysis grossly negative.   7.  Blood culture x2 sent.      IMPRESSION:   1.  Alzheimer's dementia.   2.  Pneumonia with septic shock and multiorgan failure.   3.  Acute renal failure due to septic shock.   4.  Hyperkalemia.   5.  Shock liver.   6.  Lactic acidosis due to sepsis.   7.  Non-ST elevation myocardial infarction due to demand ischemia.   8.  Hyponatremia.   9.  Diabetes mellitus type 2.   10.  Hypertension.   11.  Hyperlipidemia.   12.  Elevated D-dimer, likely due to sepsis.      PLAN:     Mr. Vila has Alzheimer's dementia and is unable to provide any useful history.  The patient's son is at bedside who agreed to convert the patient's CODE STATUS from full code to COMFORT CARE ONLY.  I have had a long discussion with the son who agreed to the comfort care measures.  Thus, will discontinue IV fluid and IV antibiotics.  No pressors as well.  Will treat his right hip pain with oral and IV Dilaudid.  O2 supplement for comfort with goal to keep O2 sat greater than 90%.  Ativan for anxiety and nausea.  Atropine ophthalmic solution for excessive secretions.  Zofran for nausea.  Bowel regimen medications ordered.  Will consult with  in a.m.         ESTEPHANIA VALIENTE MD             D: 05/09/2017 02:49   T: 05/09/2017 04:21   MT: CESAR#[id number]      Name:     EMA VILA   MRN:      [id number]         Account:      [id number]   :      1928           Admitted:     [id number]      Document: [id number]       cc: Johnny Tafoya MD

## 2017-05-09 NOTE — SIGNIFICANT EVENT
MD DEATH PRONOUNCEMENT    Called to pronounce Bhaskar Hope dead.    Physical Exam: Unresponsive to noxious stimuli, Spontaneous respirations absent, Breath sounds absent, Carotid pulse absent, Heart sounds absent, Pupillary light reflex absent and Corneal blink reflex absent    Patient was pronounced dead at 05:30 AM, May 9, 2017.    Active Problems:    * No active hospital problems. *     Pneumonia with sepsis  Dementia  Comfort cares    Please consider an autopsy if any of the following exist:  NO Unexpected or unexplained death during or following any dental, medical, or surgical diagnostic treatment procedures.   NO Death of mother at or up to seven days after delivery.     NO All  and pediatric deaths.     NO Death where the cause is sufficiently obscure to delay completion of the death certificate.   NO Deaths in which autopsy would confirm a suspected illness/condition that would affect surviving family members or recipients of transplanted organs.     The following deaths must be reported to the 's Office:  NO A death that may be due entirely or in part to any factors other than natural disease (recent surgery, recent trauma, suspected abuse/neglect).   NO A death that may be an accident, suicide, or homicide.     NO Any sudden, unexpected death in which there is no prior history of significant heart disease or any other condition associated with sudden death.   NO Any death which is apparently due to natural causes but in which the  does not have a personal physician familiar with the patient s medical history, social, or environmental situation or the circumstances of the terminal event.   NO Any death apparently due to Sudden Infant Death Syndrome.     NO Deaths that occur during, in association with, or as consequences of a diagnostic, therapeutic, or anesthetic procedure.   NO Any death in which a fracture of a major bone has occurred within the past (6) six months.   NO Any  death in which the  was an inmate of a public institution or was in the custody of Law Enforcement personnel.     Body disposition: nursing to discuss with familiy.     Livan Patel MD

## 2017-05-09 NOTE — SIGNIFICANT EVENT
Pt Arrived to unit around 0130. Disoriented x4, which was baseline per family verbal report. Skin was cool to touch and mottled. Coughing up thick, bloody sputum. Incontinent of the stool. Avzquez catheter in place with dark, bloody urine. Comfort cares provided. Repositioned at 0430. Pt checked on @ 0530 per nursing staff; unresponsive with non palpable pulse. House MD notified and pronounced  at 0530. Son notified and undated on patient condition.

## 2017-05-09 NOTE — ED NOTES
"[facility]  ED Nurse Handoff Report    ED Chief complaint: Hemoptysis (nursing home reports hemoptysis starting at 1500 today. Pt hx of alz/dementia)      ED Diagnosis:   Final diagnoses:   Hemoptysis   Acute renal failure, unspecified acute renal failure type (H)   Acute liver failure without hepatic coma       Code Status: Comfort Care, refer to MD     Allergies:   Allergies   Allergen Reactions     Penicillins        Activity level - Baseline/Home:  Total Care    Activity Level - Current:   Total Care     Needed?: No    Isolation: Yes, C.Diff, have not collected C. Diff sample as of present  Infection: Possible C.DIFF. Pneumonia    Bariatric?: No    Vital Signs:   Vitals:    05/09/17 0044 05/09/17 0050 05/09/17 0100 05/09/17 0130   BP: (!) 87/58 94/58 (!) 80/56 (!) 81/49   Pulse:       Resp: 24 28 16 13   Temp:       TempSrc:       SpO2: 94% 97% 95%    Height:           Cardiac Rhythm: ,   Cardiac  Cardiac Rhythm: Normal sinus rhythm    Pain level:  Complaints of pain to right hip.  No known injury.  States \"ouch, ouch\".    Is this patient confused?: No, Alert & Oriented to person, situation.  Recognizes family.  Knows he is at hospital.  Needs reminder of hospital name.     Patient Report: Initial Complaint: Hemoptysis.   Focused Assessment: Patient has weak sounding cough.  Intermittently productive.  This RN has witnessed 1 small amount of hemoptysis.  Son states patient is looking how he normally looks.  Patient in multiple organ failure.  MD unsure if related to dehydration or infection related.   Tests Performed: Labs, CT  Abnormal Results:  Critical results.  Multiple abnormals.  Refer to results.   Treatments provided: IV fluids.  IV antibiotics.  2 IV sites.  Vazquez catheter insertion.      Family Comments: Son Frederic with patient.     OBS brochure/video discussed/provided to patient: N/A    ED Medications:   Medications   sodium chloride (PF) 0.9% PF flush 3 mL (not " administered)   sodium chloride (PF) 0.9% PF flush 3 mL (not administered)   0.9% sodium chloride BOLUS (0 mLs Intravenous Stopped 5/9/17 0050)     Followed by   0.9% sodium chloride infusion (1,000 mLs Intravenous New Bag 5/9/17 0054)   norepinephrine (LEVOPHED) 16 mg in D5W 250 mL infusion (not administered)   ceFEPIme (MAXIPIME) 2 g vial to attach to  ml bag for ADULTS or 50 ml bag for PEDS (0 g Intravenous Stopped 5/9/17 0144)   vancomycin (VANCOCIN) 1,750 mg in NaCl 0.9 % 500 mL intermittent infusion (1,750 mg Intravenous New Bag 5/9/17 0039)   fentaNYL Citrate (PF) (SUBLIMAZE) injection 25 mcg (25 mcg Intravenous Given 5/9/17 0144)       Drips infusing?:  No      ED NURSE PHONE NUMBER: [phone]

## 2017-05-12 NOTE — DISCHARGE SUMMARY
[facility]    Death Summary  Hospitalist    Date of Admission:  5/8/2017  Date of Death:         5/9/2017  8:08 AM  Provider Completing Death Summary: Kathy Carrillo MD  Date of Service (when I saw the patient): 5/9/2017    Cause of death:  1.  Pneumonia with septic shock and multiorgan failure.   2.  Acute renal failure due to septic shock.   3.  Hyperkalemia.   4.  Shock liver.   5.  Lactic acidosis due to sepsis.   6.  Non-ST elevation myocardial infarction due to demand ischemia.   7.  Hyponatremia.   8.  Diabetes mellitus type 2.   9.  Elevated D-dimer, likely due to sepsis.     Date and time of death:   5/9/2017 at 5:30 am.    PAST MEDICAL HISTORY:   1.  Alzheimer's dementia.   2.  Severe aortic stenosis.   3.  Diabetes mellitus, type 2.   4.  Hypertension.   5.  Hyperlipidemia.   6.  Iron deficiency anemia.   7.  Degenerative joint disease.   8.  Peripheral neuropathy.   9.  Gout.   10.  Cervical degenerative disc disease.       PAST SURGICAL HISTORY:   1.  Lumbar spine discectomy.   2.  Left knee arthroscopic surgery.     History of Present Illness   Mr. Bhaskar Hope is an 89-year-old gentleman with history of Alzheimer's dementia, severe aortic stenosis, hypertension, diabetes mellitus type 2, and numerous other medical conditions.  He was hospitalized at [facility] from 03/16 through 03/21/2017 for failure to thrive.  He lived alone at that time.  [facility] , was making a home safety visit when Mr. Hope's house was noted to be in disarray.  He was brought to [facility] ED on 03/16/17 because of his inability to take of for himself.  He was hospitalized for five days for failure to thrive and discharged to [facility] where he has been residing since.  He developed at the SNF with negative C. Diff.       Mr. Hope was found to be hypotensive with systolic blood pressure in the 70s at the nursing home on 5/8/17.  His baseline SBP is in the 90s.  He had dark bloody sputum.  He was confused and  disoriented.  911 was subsequently summoned, and patient was brought to [facility] ED for evaluation.     Hospital Course   Bhaskar Hope was admitted on 5/8/2017.   Mr. Hope was septic on arrival to the ED.  Work up in the ED revealed Pneumonia with septic shock and multiorgan failure, ENRIQUE, Hyperkalemia, Shock liver, Lactic acidosis due to sepsis, NSTEMI due to demand ischemia, and Hyponatremia.  Pt's son was present at the ED.  Mr. Hope code status change to comfort care at the request of his son after which he was admitted.  He was subsequently initiated on comfort care measures.  Mr. Hope diet about 3-4 hours after admissions.  He was pronounced by a house physician.  Cause of death is pneumonia with septic shock and multiorgan failure.   Time of death is 5/9/2017 at 5:30 am.    Kathy Carrillo MD.   Hospitalist.  [phone], pager.         Pending Results   Unresulted Labs Ordered in the Past 30 Days of this Admission     Date and Time Order Name Status Description    5/8/2017 2352 Blood culture ONE site Preliminary     5/8/2017 2256 Blood culture ONE site Preliminary         Primary Care Physician   Johnny Tafoya MD    Consultations This Hospital Stay   PHARMACY TO DOSE VANCO    Time Spent on this Encounter   I, Kathy Carrillo, discharged this patient today but I did not personally see the patient today and will not be billing for the patient's discharge.

## 2017-05-15 LAB
BACTERIA SPEC CULT: NO GROWTH
BACTERIA SPEC CULT: NO GROWTH
Lab: NORMAL
MICRO REPORT STATUS: NORMAL
MICRO REPORT STATUS: NORMAL
SPECIMEN SOURCE: NORMAL
SPECIMEN SOURCE: NORMAL
